# Patient Record
Sex: MALE | Race: WHITE | URBAN - METROPOLITAN AREA
[De-identification: names, ages, dates, MRNs, and addresses within clinical notes are randomized per-mention and may not be internally consistent; named-entity substitution may affect disease eponyms.]

---

## 2017-03-20 ENCOUNTER — OFFICE VISIT (OUTPATIENT)
Dept: SURGERY | Facility: CLINIC | Age: 37
End: 2017-03-20
Payer: COMMERCIAL

## 2017-03-20 VITALS
SYSTOLIC BLOOD PRESSURE: 143 MMHG | HEIGHT: 60 IN | BODY MASS INDEX: 49.87 KG/M2 | WEIGHT: 254 LBS | HEART RATE: 84 BPM | DIASTOLIC BLOOD PRESSURE: 95 MMHG

## 2017-03-20 DIAGNOSIS — R10.31 RIGHT GROIN PAIN: Primary | ICD-10-CM

## 2017-03-20 PROCEDURE — 99203 OFFICE O/P NEW LOW 30 MIN: CPT | Performed by: SURGERY

## 2017-03-20 NOTE — PROGRESS NOTES
[facility] Consultation    CHIEF COMPLAINT:  Chief Complaint   Patient presents with     Consult     inguinal hernia        HISTORY OF PRESENT ILLNESS:  Feng Lea is a 36 year old male who is seen in consultation at the request of Dr. Mcclellan for evaluation of right groin pain, possible recurrent hernia.  He has a history of open right inguinal hernia repair, first in 1992 (no mesh) then recurrent repair with mesh in 2009.  About a week ago he developed some discomfort in his right groin while bending forward to tie his shoes but ever seen has felt well.    REVIEW OF SYSTEMS:  Constitutional:  Negative for chills, fatigue, fever and increase weight since last groin operation.  Eyes:  Negative for blurred vision  ENT:  Negative for ENT pain.  Cardiovascular:  Negative for chest pain, palpitations.  Respiratory:  Negative for cough.  Gastrointestinal:  Negative for abdominal pain, heartburn, constipation, diarrhea and stool changes.  Musculoskeletal:  Negative for arthralgias, back pain and myalgias. See HPI.      Past Medical History   Diagnosis Date     Pulmonary embolism (H) 01/2015       Past Surgical History   Procedure Laterality Date     Surgical history of -   age 11     right inguinal hernia repair     Hernia repair       2009     Back surgery         Family History   Problem Relation Age of Onset     Breast Cancer Paternal Grandmother      alzheimers     CANCER Maternal Grandmother      skin cancer     Depression Father      Depression Brother      Allergies Son      C.A.D. Paternal Uncle      mi,maybe htn     C.A.D. Paternal Uncle      mi,maybe htn,stroke     CANCER Maternal Aunt      not sure what kind     DIABETES No family hx of      Cancer - colorectal No family hx of      Prostate Cancer No family hx of        Social History   Substance Use Topics     Smoking status: Never Smoker     Smokeless tobacco: Not on file     Alcohol use No       There is no problem list on file for  this patient.      Allergies   Allergen Reactions     No Known Allergies        Current Outpatient Prescriptions   Medication Sig Dispense Refill     Vilazodone HCl (VIIBRYD PO) Take 40 mg by mouth       ALPRAZolam (XANAX PO)        Rivaroxaban (XARELTO PO) Take 20 mg by mouth       Zolpidem Tartrate (AMBIEN PO)        METHOCARBAMOL PO        GABAPENTIN PO          Vitals: BP (!) 143/95  Pulse 84  Ht 5' (1.524 m)  Wt 254 lb (115.2 kg)  BMI 49.61 kg/m2  BMI= Body mass index is 49.61 kg/(m^2).    EXAM:  GENERAL: obese, alert and no distress   HEENT: moist mucus membranes, no scleral icterus  CARDIOVASCULAR:  No JVD  RESPIRATORY: non labored breathing  NECK: Neck supple. No noticeable masses.  ABDOMEN: soft, nontender, nondistended, no hernia palpated.  Extremities: warm and well perfused, no edema  SKIN: No suspicious lesions or rashes  LYMPH: Normal inguinal lymph nodes    LABS/Imaging: none    ASSESSMENT:  Feng Lea suffers from right groin pain.    PLAN:  I discussed with Mr. Lea and his wife that given the scar tissue in this area, hernia could be difficult to palpate, he has not noticed a bulge neither I do on physical examination, all I can appreciate is scar tissue.   We talked about resting and icing his right groin and treating it as a groin strain for the next couple weeks.  Once he returns to full activities if he is to develop pain once again or notices a bulge then we can proceed with surgical treatment or obtain radiographic evaluation (MRI).  I stressed the importance of weight loss for the overall health but also for the health of his right groin.  He will touch base with us based on his symptoms, we will update you his progress.    It is my pleasure to participate in the care of Feng Lea. Thank you for this consultation.     If you have any questions please give me a call.    Best regards,  Darrell Arteaga MD    Please route or send letter to:  Primary Care Provider (PCP), Referring  Provider and Include Progress Note    Total time with patient visit: 30 minutes more than half spent in counseling, explanation of procedures and coordination of care.

## 2017-03-20 NOTE — LETTER
2017    RE:  Feng Lea-:  80    HISTORY OF PRESENT ILLNESS: Feng Lea is a 36 year old male who is seen in consultation at the request of Dr. Mcclellan for evaluation of right groin pain, possible recurrent hernia. He has a history of open right inguinal hernia repair, first in  (no mesh) then recurrent repair with mesh in . About a week ago he developed some discomfort in his right groin while bending forward to tie his shoes but ever seen has felt well.     REVIEW OF SYSTEMS:  Constitutional: Negative for chills, fatigue, fever and increase weight since last groin operation.  Eyes: Negative for blurred vision  ENT: Negative for ENT pain.  Cardiovascular: Negative for chest pain, palpitations.  Respiratory: Negative for cough.  Gastrointestinal: Negative for abdominal pain, heartburn, constipation, diarrhea and stool changes.  Musculoskeletal: Negative for arthralgias, back pain and myalgias. See HPI.      Vitals: BP (!) 143/95 Pulse 84 Ht 5' (1.524 m) Wt 254 lb (115.2 kg) BMI 49.61 kg/m2  BMI= Body mass index is 49.61 kg/(m^2).     EXAM:  GENERAL: obese, alert and no distress   HEENT: moist mucus membranes, no scleral icterus  CARDIOVASCULAR: No JVD  RESPIRATORY: non labored breathing  NECK: Neck supple. No noticeable masses.  ABDOMEN: soft, nontender, nondistended, no hernia palpated.  Extremities: warm and well perfused, no edema  SKIN: No suspicious lesions or rashes  LYMPH: Normal inguinal lymph nodes     LABS/Imaging: none     ASSESSMENT:  Feng Lea suffers from right groin pain.     PLAN:  I discussed with Mr. Lea and his wife that given the scar tissue in this area, hernia could be difficult to palpate, he has not noticed a bulge neither I do on physical examination, all I can appreciate is scar tissue.   We talked about resting and icing his right groin and treating it as a groin strain for the next couple weeks. Once he returns to full activities if he is to  develop pain once again or notices a bulge then we can proceed with surgical treatment or obtain radiographic evaluation (MRI). I stressed the importance of weight loss for the overall health but also for the health of his right groin.  He will touch base with us based on his symptoms, we will update you his progress.     It is my pleasure to participate in the care of Feng Lea. Thank you for this consultation.      If you have any questions please give me a call.     Best regards,      Darrell Arteaga MD

## 2017-03-20 NOTE — MR AVS SNAPSHOT
"              After Visit Summary   3/20/2017    Feng Lea    MRN: [id number]           Patient Information     Date Of Birth          1980        Visit Information        Provider Department      3/20/2017 11:00 AM Darrell Arteaga MD [facility]       Follow-ups after your visit        Who to contact     If you have questions or need follow up information about today's clinic visit or your schedule please contact [facility] directly at [phone].  Normal or non-critical lab and imaging results will be communicated to you by [employer], letter or phone within 4 business days after the clinic has received the results. If you do not hear from us within 7 days, please contact the clinic through [employer] or phone. If you have a critical or abnormal lab result, we will notify you by phone as soon as possible.  Submit refill requests through [employer] or call your pharmacy and they will forward the refill request to us. Please allow 3 business days for your refill to be completed.          Additional Information About Your Visit        MyChart Information     [employer] lets you send messages to your doctor, view your test results, renew your prescriptions, schedule appointments and more. To sign up, go to [email or web address] . Click on \"Log in\" on the left side of the screen, which will take you to the Welcome page. Then click on \"Sign up Now\" on the right side of the page.     You will be asked to enter the access code listed below, as well as some personal information. Please follow the directions to create your username and password.     Your access code is: [email or web address]  Expires: 2017 11:48 AM     Your access code will  in 90 days. If you need help or a new code, please call your [place] clinic or [phone].        Care EveryWhere ID     This is your Care EveryWhere ID. This could be used by other organizations to " access your [place] medical records  [id number]        Your Vitals Were     Pulse Height BMI (Body Mass Index)             84 5' (1.524 m) 49.61 kg/m2          Blood Pressure from Last 3 Encounters:   03/20/17 (!) 143/95   11/19/04 124/84    Weight from Last 3 Encounters:   03/20/17 254 lb (115.2 kg)   11/19/04 216 lb (98 kg)              Today, you had the following     No orders found for display       Primary Care Provider Office Phone # Fax #    Vanesa Valente [phone]       [facility] [address] [place] [address]        Thank you!     Thank you for choosing [facility]  for your care. Our goal is always to provide you with excellent care. Hearing back from our patients is one way we can continue to improve our services. Please take a few minutes to complete the written survey that you may receive in the mail after your visit with us. Thank you!             Your Updated Medication List - Protect others around you: Learn how to safely use, store and throw away your medicines at www.disposemymeds.org.          This list is accurate as of: 3/20/17 11:48 AM.  Always use your most recent med list.                   Brand Name Dispense Instructions for use    AMBIEN PO          GABAPENTIN PO          METHOCARBAMOL PO          VIIBRYD PO      Take 40 mg by mouth       XANAX PO          XARELTO PO      Take 20 mg by mouth

## 2020-12-12 PROBLEM — Z13.1 SCREENING FOR DIABETES MELLITUS: Status: ACTIVE | Noted: 2020-12-12

## 2020-12-12 PROBLEM — Z13.220 SCREENING FOR CHOLESTEROL LEVEL: Status: ACTIVE | Noted: 2020-12-12

## 2020-12-12 PROBLEM — Z12.5 SCREENING FOR PROSTATE CANCER: Status: ACTIVE | Noted: 2020-12-12

## 2020-12-15 ENCOUNTER — OFFICE VISIT (OUTPATIENT)
Dept: FAMILY MEDICINE | Age: 40
End: 2020-12-15

## 2020-12-15 ENCOUNTER — LAB SERVICES (OUTPATIENT)
Dept: LAB | Age: 40
End: 2020-12-15

## 2020-12-15 VITALS
DIASTOLIC BLOOD PRESSURE: 88 MMHG | HEIGHT: 72 IN | HEART RATE: 72 BPM | BODY MASS INDEX: 34.39 KG/M2 | SYSTOLIC BLOOD PRESSURE: 138 MMHG | WEIGHT: 253.9 LBS | TEMPERATURE: 96.4 F

## 2020-12-15 DIAGNOSIS — M54.42 CHRONIC MIDLINE LOW BACK PAIN WITH BILATERAL SCIATICA: ICD-10-CM

## 2020-12-15 DIAGNOSIS — Z79.899 HIGH RISK MEDICATION USE: ICD-10-CM

## 2020-12-15 DIAGNOSIS — Z12.5 SCREENING FOR PROSTATE CANCER: ICD-10-CM

## 2020-12-15 DIAGNOSIS — M54.41 CHRONIC MIDLINE LOW BACK PAIN WITH BILATERAL SCIATICA: ICD-10-CM

## 2020-12-15 DIAGNOSIS — G89.4 CHRONIC PAIN SYNDROME: ICD-10-CM

## 2020-12-15 DIAGNOSIS — F41.9 ANXIETY: ICD-10-CM

## 2020-12-15 DIAGNOSIS — M79.7 FIBROMYALGIA: ICD-10-CM

## 2020-12-15 DIAGNOSIS — Z79.01 CURRENT USE OF LONG TERM ANTICOAGULATION: ICD-10-CM

## 2020-12-15 DIAGNOSIS — J30.89 NON-SEASONAL ALLERGIC RHINITIS DUE TO OTHER ALLERGIC TRIGGER: ICD-10-CM

## 2020-12-15 DIAGNOSIS — Z13.220 SCREENING FOR CHOLESTEROL LEVEL: ICD-10-CM

## 2020-12-15 DIAGNOSIS — G89.29 CHRONIC MIDLINE LOW BACK PAIN WITH BILATERAL SCIATICA: ICD-10-CM

## 2020-12-15 DIAGNOSIS — Z13.1 SCREENING FOR DIABETES MELLITUS: ICD-10-CM

## 2020-12-15 DIAGNOSIS — Z86.711 HISTORY OF PULMONARY EMBOLISM: ICD-10-CM

## 2020-12-15 DIAGNOSIS — F31.76 BIPOLAR DISORDER, IN FULL REMISSION, MOST RECENT EPISODE DEPRESSED (CMD): Primary | ICD-10-CM

## 2020-12-15 DIAGNOSIS — K21.9 GASTROESOPHAGEAL REFLUX DISEASE, UNSPECIFIED WHETHER ESOPHAGITIS PRESENT: ICD-10-CM

## 2020-12-15 DIAGNOSIS — K57.30 DIVERTICULOSIS OF COLON: ICD-10-CM

## 2020-12-15 DIAGNOSIS — K52.9 CHRONIC DIARRHEA: ICD-10-CM

## 2020-12-15 PROBLEM — F41.0 PANIC ATTACK: Status: ACTIVE | Noted: 2020-12-15

## 2020-12-15 PROBLEM — E55.9 VITAMIN D DEFICIENCY: Status: ACTIVE | Noted: 2020-12-15

## 2020-12-15 PROBLEM — G47.09 OTHER INSOMNIA: Status: ACTIVE | Noted: 2020-12-15

## 2020-12-15 LAB
AMPHETAMINES UR QL SCN>500 NG/ML: NEGATIVE
BARBITURATES UR QL SCN>200 NG/ML: NEGATIVE
BENZODIAZ UR QL SCN>200 NG/ML: POSITIVE
BZE UR QL SCN>150 NG/ML: NEGATIVE
CANNABINOIDS UR QL SCN>50 NG/ML: NEGATIVE
OPIATES UR QL SCN>300 NG/ML: NEGATIVE
PCP UR QL SCN>25 NG/ML: NEGATIVE

## 2020-12-15 PROCEDURE — 80307 DRUG TEST PRSMV CHEM ANLYZR: CPT | Performed by: CLINICAL MEDICAL LABORATORY

## 2020-12-15 PROCEDURE — 99204 OFFICE O/P NEW MOD 45 MIN: CPT | Performed by: FAMILY MEDICINE

## 2020-12-15 RX ORDER — FLUTICASONE PROPIONATE 50 MCG
2 SPRAY, SUSPENSION (ML) NASAL DAILY
COMMUNITY
End: 2022-02-17 | Stop reason: CLARIF

## 2020-12-15 RX ORDER — MULTIVIT-MIN/IRON/FOLIC ACID/K 18-600-40
50 CAPSULE ORAL DAILY
Status: ON HOLD | COMMUNITY
End: 2021-04-01 | Stop reason: ALTCHOICE

## 2020-12-15 RX ORDER — PREGABALIN 150 MG/1
150 CAPSULE ORAL 2 TIMES DAILY
COMMUNITY
End: 2020-12-15 | Stop reason: SDUPTHER

## 2020-12-15 RX ORDER — VILAZODONE HYDROCHLORIDE 40 MG/1
40 TABLET ORAL DAILY
COMMUNITY
End: 2021-01-06 | Stop reason: ALTCHOICE

## 2020-12-15 RX ORDER — PANTOPRAZOLE SODIUM 40 MG/1
40 TABLET, DELAYED RELEASE ORAL 2 TIMES DAILY
COMMUNITY
End: 2021-11-29 | Stop reason: SDUPTHER

## 2020-12-15 RX ORDER — HYDROXYZINE HYDROCHLORIDE 25 MG/1
25 TABLET, FILM COATED ORAL 3 TIMES DAILY PRN
COMMUNITY
End: 2021-01-13 | Stop reason: SDUPTHER

## 2020-12-15 RX ORDER — VALACYCLOVIR HYDROCHLORIDE 1 G/1
1000 TABLET, FILM COATED ORAL DAILY
COMMUNITY
End: 2021-01-13 | Stop reason: SDUPTHER

## 2020-12-15 RX ORDER — METHOCARBAMOL 750 MG/1
750 TABLET, FILM COATED ORAL 3 TIMES DAILY
COMMUNITY
End: 2021-08-09

## 2020-12-15 RX ORDER — DIVALPROEX SODIUM 500 MG/1
500 TABLET, EXTENDED RELEASE ORAL AT BEDTIME
COMMUNITY
End: 2021-01-06 | Stop reason: SDUPTHER

## 2020-12-15 RX ORDER — RISPERIDONE 1 MG/1
1 TABLET ORAL AT BEDTIME
COMMUNITY
End: 2021-01-06 | Stop reason: SDUPTHER

## 2020-12-15 RX ORDER — MAGNESIUM HYDROXIDE/ALUMINUM HYDROXICE/SIMETHICONE 120; 1200; 1200 MG/30ML; MG/30ML; MG/30ML
10 SUSPENSION ORAL
COMMUNITY

## 2020-12-15 RX ORDER — ALPRAZOLAM 1 MG/1
1 TABLET ORAL 2 TIMES DAILY PRN
COMMUNITY
End: 2021-01-06 | Stop reason: SDUPTHER

## 2020-12-15 RX ORDER — LOPERAMIDE HYDROCHLORIDE 2 MG/1
2 CAPSULE ORAL
COMMUNITY

## 2020-12-15 RX ORDER — ZOLPIDEM TARTRATE 10 MG/1
10 TABLET ORAL NIGHTLY PRN
COMMUNITY
End: 2021-01-06 | Stop reason: ALTCHOICE

## 2020-12-15 RX ORDER — PREGABALIN 150 MG/1
150 CAPSULE ORAL 2 TIMES DAILY
Qty: 60 CAPSULE | Refills: 1 | Status: SHIPPED | OUTPATIENT
Start: 2020-12-15 | End: 2021-01-13 | Stop reason: SDUPTHER

## 2020-12-15 SDOH — HEALTH STABILITY: MENTAL HEALTH: HOW OFTEN DO YOU HAVE A DRINK CONTAINING ALCOHOL?: NEVER

## 2020-12-15 ASSESSMENT — PATIENT HEALTH QUESTIONNAIRE - PHQ9
CLINICAL INTERPRETATION OF PHQ9 SCORE: NO FURTHER SCREENING NEEDED
SUM OF ALL RESPONSES TO PHQ9 QUESTIONS 1 AND 2: 0
SUM OF ALL RESPONSES TO PHQ9 QUESTIONS 1 AND 2: 0
1. LITTLE INTEREST OR PLEASURE IN DOING THINGS: NOT AT ALL
CLINICAL INTERPRETATION OF PHQ2 SCORE: NO FURTHER SCREENING NEEDED
2. FEELING DOWN, DEPRESSED OR HOPELESS: NOT AT ALL

## 2020-12-31 ENCOUNTER — TELEPHONE (OUTPATIENT)
Dept: PSYCHIATRY | Age: 40
End: 2020-12-31

## 2021-01-01 ENCOUNTER — EXTERNAL RECORD (OUTPATIENT)
Dept: OTHER | Age: 41
End: 2021-01-01

## 2021-01-06 ENCOUNTER — V-VISIT (OUTPATIENT)
Dept: PSYCHIATRY | Age: 41
End: 2021-01-06

## 2021-01-06 ENCOUNTER — TELEPHONE (OUTPATIENT)
Dept: PSYCHIATRY | Age: 41
End: 2021-01-06

## 2021-01-06 DIAGNOSIS — F31.0 BIPOLAR AFFECTIVE DISORDER, CURRENT EPISODE HYPOMANIC (CMD): Primary | ICD-10-CM

## 2021-01-06 PROBLEM — F41.9 ANXIETY: Status: RESOLVED | Noted: 2020-12-15 | Resolved: 2021-01-06

## 2021-01-06 PROBLEM — F41.0 PANIC ATTACK: Status: RESOLVED | Noted: 2020-12-15 | Resolved: 2021-01-06

## 2021-01-06 PROBLEM — G47.09 OTHER INSOMNIA: Status: RESOLVED | Noted: 2020-12-15 | Resolved: 2021-01-06

## 2021-01-06 PROBLEM — F31.10 BIPOLAR AFFECTIVE DISORDER, CURRENT EPISODE MANIC (CMD): Status: ACTIVE | Noted: 2020-12-15

## 2021-01-06 PROCEDURE — 90792 PSYCH DIAG EVAL W/MED SRVCS: CPT | Performed by: NURSE PRACTITIONER

## 2021-01-06 RX ORDER — RISPERIDONE 1 MG/1
1 TABLET ORAL AT BEDTIME
Qty: 30 TABLET | Refills: 3 | Status: SHIPPED | OUTPATIENT
Start: 2021-01-06 | End: 2021-01-08

## 2021-01-06 RX ORDER — DIVALPROEX SODIUM 250 MG/1
250 TABLET, EXTENDED RELEASE ORAL NIGHTLY
Qty: 30 TABLET | Refills: 3 | Status: SHIPPED | OUTPATIENT
Start: 2021-01-06 | End: 2021-01-27 | Stop reason: DRUGHIGH

## 2021-01-06 RX ORDER — ALPRAZOLAM 1 MG/1
1 TABLET ORAL 2 TIMES DAILY PRN
Qty: 60 TABLET | Refills: 3 | Status: SHIPPED | OUTPATIENT
Start: 2021-01-06 | End: 2021-10-27

## 2021-01-06 RX ORDER — DIVALPROEX SODIUM 500 MG/1
500 TABLET, EXTENDED RELEASE ORAL AT BEDTIME
Qty: 30 TABLET | Refills: 3 | Status: SHIPPED | OUTPATIENT
Start: 2021-01-06 | End: 2021-01-27 | Stop reason: SDUPTHER

## 2021-01-06 RX ORDER — VILAZODONE HYDROCHLORIDE 40 MG/1
40 TABLET ORAL DAILY
Qty: 30 TABLET | Refills: 3 | Status: SHIPPED | OUTPATIENT
Start: 2021-01-06 | End: 2021-01-27 | Stop reason: SDUPTHER

## 2021-01-06 RX ORDER — QUETIAPINE FUMARATE 50 MG/1
TABLET, FILM COATED ORAL
Qty: 60 TABLET | Refills: 3 | Status: SHIPPED | OUTPATIENT
Start: 2021-01-06 | End: 2021-01-08 | Stop reason: SDUPTHER

## 2021-01-08 ENCOUNTER — TELEPHONE (OUTPATIENT)
Dept: PSYCHIATRY | Age: 41
End: 2021-01-08

## 2021-01-08 ENCOUNTER — LAB SERVICES (OUTPATIENT)
Dept: LAB | Age: 41
End: 2021-01-08

## 2021-01-08 DIAGNOSIS — F31.0 BIPOLAR AFFECTIVE DISORDER, CURRENT EPISODE HYPOMANIC (CMD): ICD-10-CM

## 2021-01-08 LAB — VALPROATE SERPL-MCNC: 33 MCG/ML (ref 50–125)

## 2021-01-08 PROCEDURE — 36415 COLL VENOUS BLD VENIPUNCTURE: CPT | Performed by: INTERNAL MEDICINE

## 2021-01-08 PROCEDURE — 80164 ASSAY DIPROPYLACETIC ACD TOT: CPT | Performed by: CLINICAL MEDICAL LABORATORY

## 2021-01-08 RX ORDER — QUETIAPINE FUMARATE 50 MG/1
TABLET, FILM COATED ORAL
Qty: 60 TABLET | Refills: 3 | Status: SHIPPED | COMMUNITY
Start: 2021-01-08 | End: 2021-01-27 | Stop reason: SDUPTHER

## 2021-01-13 ENCOUNTER — OFFICE VISIT (OUTPATIENT)
Dept: FAMILY MEDICINE | Age: 41
End: 2021-01-13

## 2021-01-13 VITALS
WEIGHT: 255.4 LBS | HEIGHT: 72 IN | TEMPERATURE: 97.4 F | DIASTOLIC BLOOD PRESSURE: 90 MMHG | HEART RATE: 74 BPM | SYSTOLIC BLOOD PRESSURE: 124 MMHG | BODY MASS INDEX: 34.59 KG/M2

## 2021-01-13 DIAGNOSIS — M79.7 FIBROMYALGIA: ICD-10-CM

## 2021-01-13 DIAGNOSIS — E66.9 OBESITY (BMI 30-39.9): ICD-10-CM

## 2021-01-13 DIAGNOSIS — Z13.220 SCREENING FOR CHOLESTEROL LEVEL: ICD-10-CM

## 2021-01-13 DIAGNOSIS — G89.29 CHRONIC MIDLINE LOW BACK PAIN WITH BILATERAL SCIATICA: ICD-10-CM

## 2021-01-13 DIAGNOSIS — E55.9 VITAMIN D DEFICIENCY: ICD-10-CM

## 2021-01-13 DIAGNOSIS — Z13.1 SCREENING FOR DIABETES MELLITUS: ICD-10-CM

## 2021-01-13 DIAGNOSIS — E01.0 THYROMEGALY: ICD-10-CM

## 2021-01-13 DIAGNOSIS — G89.4 CHRONIC PAIN SYNDROME: ICD-10-CM

## 2021-01-13 DIAGNOSIS — A60.01 HERPES SIMPLEX INFECTION OF PENIS: Primary | ICD-10-CM

## 2021-01-13 DIAGNOSIS — M54.42 CHRONIC MIDLINE LOW BACK PAIN WITH BILATERAL SCIATICA: ICD-10-CM

## 2021-01-13 DIAGNOSIS — K52.9 CHRONIC DIARRHEA: ICD-10-CM

## 2021-01-13 DIAGNOSIS — M54.41 CHRONIC MIDLINE LOW BACK PAIN WITH BILATERAL SCIATICA: ICD-10-CM

## 2021-01-13 DIAGNOSIS — J30.89 NON-SEASONAL ALLERGIC RHINITIS DUE TO OTHER ALLERGIC TRIGGER: ICD-10-CM

## 2021-01-13 PROCEDURE — 99214 OFFICE O/P EST MOD 30 MIN: CPT | Performed by: FAMILY MEDICINE

## 2021-01-13 RX ORDER — VALACYCLOVIR HYDROCHLORIDE 500 MG/1
1000 TABLET, FILM COATED ORAL DAILY
Qty: 90 TABLET | Refills: 1 | Status: SHIPPED | OUTPATIENT
Start: 2021-01-13 | End: 2021-01-29

## 2021-01-13 RX ORDER — HYDROXYZINE 50 MG/1
50 TABLET, FILM COATED ORAL NIGHTLY PRN
Qty: 90 TABLET | Refills: 1 | Status: SHIPPED | OUTPATIENT
Start: 2021-01-13 | End: 2021-04-28

## 2021-01-13 RX ORDER — PREGABALIN 150 MG/1
150 CAPSULE ORAL 2 TIMES DAILY
Qty: 60 CAPSULE | Refills: 5 | Status: SHIPPED | OUTPATIENT
Start: 2021-01-13 | End: 2021-07-29 | Stop reason: SDUPTHER

## 2021-01-13 SDOH — HEALTH STABILITY: MENTAL HEALTH: HOW OFTEN DO YOU HAVE A DRINK CONTAINING ALCOHOL?: NEVER

## 2021-01-14 ENCOUNTER — LAB SERVICES (OUTPATIENT)
Dept: LAB | Age: 41
End: 2021-01-14

## 2021-01-14 DIAGNOSIS — Z13.220 SCREENING FOR CHOLESTEROL LEVEL: ICD-10-CM

## 2021-01-14 DIAGNOSIS — E66.9 OBESITY (BMI 30-39.9): ICD-10-CM

## 2021-01-14 DIAGNOSIS — K52.9 CHRONIC DIARRHEA: ICD-10-CM

## 2021-01-14 DIAGNOSIS — Z13.1 SCREENING FOR DIABETES MELLITUS: ICD-10-CM

## 2021-01-14 DIAGNOSIS — E01.0 THYROMEGALY: ICD-10-CM

## 2021-01-14 LAB
ALBUMIN SERPL-MCNC: 3.5 G/DL (ref 3.6–5.1)
ALBUMIN/GLOB SERPL: 1 {RATIO} (ref 1–2.4)
ALP SERPL-CCNC: 63 UNITS/L (ref 45–117)
ALT SERPL-CCNC: 37 UNITS/L
ANION GAP SERPL CALC-SCNC: 14 MMOL/L (ref 10–20)
AST SERPL-CCNC: 19 UNITS/L
BASOPHILS # BLD: 0 K/MCL (ref 0–0.3)
BASOPHILS NFR BLD: 1 %
BILIRUB SERPL-MCNC: 0.3 MG/DL (ref 0.2–1)
BUN SERPL-MCNC: 16 MG/DL (ref 6–20)
BUN/CREAT SERPL: 16 (ref 7–25)
CALCIUM SERPL-MCNC: 9.3 MG/DL (ref 8.4–10.2)
CHLORIDE SERPL-SCNC: 108 MMOL/L (ref 98–107)
CHOLEST SERPL-MCNC: 196 MG/DL
CHOLEST/HDLC SERPL: 5.4 {RATIO}
CO2 SERPL-SCNC: 29 MMOL/L (ref 21–32)
CREAT SERPL-MCNC: 1 MG/DL (ref 0.67–1.17)
CRP SERPL-MCNC: <0.3 MG/DL
DEPRECATED RDW RBC: 47.4 FL (ref 39–50)
EOSINOPHIL # BLD: 0.2 K/MCL (ref 0–0.5)
EOSINOPHIL NFR BLD: 3 %
ERYTHROCYTE [DISTWIDTH] IN BLOOD: 14.9 % (ref 11–15)
ERYTHROCYTE [SEDIMENTATION RATE] IN BLOOD BY WESTERGREN METHOD: 7 MM/HR (ref 0–20)
FASTING DURATION TIME PATIENT: 12 HOURS
FASTING DURATION TIME PATIENT: 12 HOURS
GFR SERPLBLD BASED ON 1.73 SQ M-ARVRAT: >90 ML/MIN/1.73M2
GLOBULIN SER-MCNC: 3.6 G/DL (ref 2–4)
GLUCOSE SERPL-MCNC: 98 MG/DL (ref 65–99)
HCT VFR BLD CALC: 45.1 % (ref 39–51)
HDLC SERPL-MCNC: 36 MG/DL
HGB BLD-MCNC: 15 G/DL (ref 13–17)
LDLC SERPL CALC-MCNC: 132 MG/DL
LYMPHOCYTES # BLD: 2.4 K/MCL (ref 1–4.8)
LYMPHOCYTES NFR BLD: 46 %
MCH RBC QN AUTO: 29.8 PG (ref 26–34)
MCHC RBC AUTO-ENTMCNC: 33.3 G/DL (ref 32–36.5)
MCV RBC AUTO: 89.5 FL (ref 78–100)
MONOCYTES # BLD: 0.5 K/MCL (ref 0.3–0.9)
MONOCYTES NFR BLD: 9 %
NEUTROPHILS # BLD: 2.2 K/MCL (ref 1.8–7.7)
NEUTROPHILS NFR BLD: 41 %
NONHDLC SERPL-MCNC: 160 MG/DL
PLATELET # BLD AUTO: 237 K/MCL (ref 140–450)
POTASSIUM SERPL-SCNC: 4.5 MMOL/L (ref 3.4–5.1)
PROT SERPL-MCNC: 7.1 G/DL (ref 6.4–8.2)
RBC # BLD: 5.04 MIL/MCL (ref 4.5–5.9)
SODIUM SERPL-SCNC: 146 MMOL/L (ref 135–145)
T3FREE SERPL-MCNC: 3 PG/ML (ref 2.2–4)
T4 FREE SERPL-MCNC: 0.9 NG/DL (ref 0.8–1.5)
TRIGL SERPL-MCNC: 141 MG/DL
TSH SERPL-ACNC: 0.87 MCUNITS/ML (ref 0.35–5)
WBC # BLD: 5.3 K/MCL (ref 4.2–11)

## 2021-01-14 PROCEDURE — 85025 COMPLETE CBC W/AUTO DIFF WBC: CPT | Performed by: INTERNAL MEDICINE

## 2021-01-14 PROCEDURE — 84481 FREE ASSAY (FT-3): CPT | Performed by: CLINICAL MEDICAL LABORATORY

## 2021-01-14 PROCEDURE — 84439 ASSAY OF FREE THYROXINE: CPT | Performed by: CLINICAL MEDICAL LABORATORY

## 2021-01-14 PROCEDURE — 82784 ASSAY IGA/IGD/IGG/IGM EACH: CPT | Performed by: CLINICAL MEDICAL LABORATORY

## 2021-01-14 PROCEDURE — 80061 LIPID PANEL: CPT | Performed by: CLINICAL MEDICAL LABORATORY

## 2021-01-14 PROCEDURE — 80053 COMPREHEN METABOLIC PANEL: CPT | Performed by: INTERNAL MEDICINE

## 2021-01-14 PROCEDURE — 85652 RBC SED RATE AUTOMATED: CPT | Performed by: INTERNAL MEDICINE

## 2021-01-14 PROCEDURE — 86140 C-REACTIVE PROTEIN: CPT | Performed by: CLINICAL MEDICAL LABORATORY

## 2021-01-14 PROCEDURE — 36415 COLL VENOUS BLD VENIPUNCTURE: CPT | Performed by: INTERNAL MEDICINE

## 2021-01-14 PROCEDURE — 83516 IMMUNOASSAY NONANTIBODY: CPT | Performed by: CLINICAL MEDICAL LABORATORY

## 2021-01-14 PROCEDURE — 84443 ASSAY THYROID STIM HORMONE: CPT | Performed by: CLINICAL MEDICAL LABORATORY

## 2021-01-15 LAB
IGA SERPL-MCNC: 299 MG/DL (ref 82–453)
TTG IGA SER IA-ACNC: <20 UNITS

## 2021-01-16 ENCOUNTER — HOSPITAL ENCOUNTER (OUTPATIENT)
Dept: ULTRASOUND IMAGING | Age: 41
Discharge: HOME | End: 2021-01-16
Attending: FAMILY MEDICINE

## 2021-01-16 DIAGNOSIS — E01.0 THYROMEGALY: ICD-10-CM

## 2021-01-16 PROCEDURE — 76536 US EXAM OF HEAD AND NECK: CPT | Performed by: RADIOLOGY

## 2021-01-16 PROCEDURE — 76536 US EXAM OF HEAD AND NECK: CPT

## 2021-01-18 ENCOUNTER — TELEPHONE (OUTPATIENT)
Dept: FAMILY MEDICINE | Age: 41
End: 2021-01-18

## 2021-01-18 DIAGNOSIS — E04.2 MULTIPLE THYROID NODULES: Primary | ICD-10-CM

## 2021-01-27 ENCOUNTER — V-VISIT (OUTPATIENT)
Dept: PSYCHIATRY | Age: 41
End: 2021-01-27

## 2021-01-27 DIAGNOSIS — F31.0 BIPOLAR AFFECTIVE DISORDER, CURRENT EPISODE HYPOMANIC (CMD): Primary | ICD-10-CM

## 2021-01-27 PROCEDURE — 99214 OFFICE O/P EST MOD 30 MIN: CPT | Performed by: NURSE PRACTITIONER

## 2021-01-27 RX ORDER — QUETIAPINE FUMARATE 200 MG/1
TABLET, FILM COATED ORAL
Qty: 45 TABLET | Refills: 3 | Status: SHIPPED | OUTPATIENT
Start: 2021-01-27 | End: 2021-01-29

## 2021-01-27 RX ORDER — DIVALPROEX SODIUM 500 MG/1
500 TABLET, EXTENDED RELEASE ORAL AT BEDTIME
Qty: 30 TABLET | Refills: 3 | Status: SHIPPED | OUTPATIENT
Start: 2021-01-27 | End: 2021-09-08

## 2021-01-27 RX ORDER — DIVALPROEX SODIUM 250 MG/1
250 TABLET, EXTENDED RELEASE ORAL 2 TIMES DAILY
Qty: 30 TABLET | Refills: 3 | Status: SHIPPED | OUTPATIENT
Start: 2021-01-27 | End: 2021-03-22

## 2021-01-27 RX ORDER — VILAZODONE HYDROCHLORIDE 40 MG/1
40 TABLET ORAL DAILY
Qty: 30 TABLET | Refills: 3 | Status: SHIPPED | OUTPATIENT
Start: 2021-01-27 | End: 2021-09-21 | Stop reason: SDUPTHER

## 2021-01-28 DIAGNOSIS — A60.01 HERPES SIMPLEX INFECTION OF PENIS: ICD-10-CM

## 2021-01-28 DIAGNOSIS — F31.0 BIPOLAR AFFECTIVE DISORDER, CURRENT EPISODE HYPOMANIC (CMD): ICD-10-CM

## 2021-01-29 ENCOUNTER — E-ADVICE (OUTPATIENT)
Dept: FAMILY MEDICINE | Age: 41
End: 2021-01-29

## 2021-01-29 ENCOUNTER — OFFICE VISIT (OUTPATIENT)
Dept: OTOLARYNGOLOGY | Age: 41
End: 2021-01-29
Attending: FAMILY MEDICINE

## 2021-01-29 DIAGNOSIS — E04.1 THYROID NODULE: ICD-10-CM

## 2021-01-29 DIAGNOSIS — Z01.812 PRE-PROCEDURAL LABORATORY EXAMINATION: Primary | ICD-10-CM

## 2021-01-29 PROCEDURE — 99203 OFFICE O/P NEW LOW 30 MIN: CPT | Performed by: OTOLARYNGOLOGY

## 2021-01-29 RX ORDER — VALACYCLOVIR HYDROCHLORIDE 500 MG/1
TABLET, FILM COATED ORAL
Qty: 180 TABLET | Refills: 1 | Status: SHIPPED | OUTPATIENT
Start: 2021-01-29 | End: 2021-05-13 | Stop reason: SDUPTHER

## 2021-01-29 RX ORDER — QUETIAPINE FUMARATE 200 MG/1
TABLET, FILM COATED ORAL
Qty: 135 TABLET | Refills: 1 | Status: SHIPPED | OUTPATIENT
Start: 2021-01-29 | End: 2021-09-21 | Stop reason: SDUPTHER

## 2021-02-02 ENCOUNTER — OFFICE VISIT (OUTPATIENT)
Dept: FAMILY MEDICINE | Age: 41
End: 2021-02-02

## 2021-02-02 VITALS
WEIGHT: 257.4 LBS | DIASTOLIC BLOOD PRESSURE: 74 MMHG | HEIGHT: 72 IN | HEART RATE: 80 BPM | TEMPERATURE: 97.3 F | BODY MASS INDEX: 34.86 KG/M2 | SYSTOLIC BLOOD PRESSURE: 128 MMHG

## 2021-02-02 DIAGNOSIS — R10.32 LEFT GROIN PAIN: Primary | ICD-10-CM

## 2021-02-02 PROCEDURE — 99213 OFFICE O/P EST LOW 20 MIN: CPT | Performed by: FAMILY MEDICINE

## 2021-02-02 SDOH — HEALTH STABILITY: MENTAL HEALTH: HOW OFTEN DO YOU HAVE A DRINK CONTAINING ALCOHOL?: NEVER

## 2021-02-04 ENCOUNTER — TELEPHONE (OUTPATIENT)
Dept: INTERVENTIONAL RADIOLOGY/VASCULAR | Age: 41
End: 2021-02-04

## 2021-02-04 DIAGNOSIS — Z01.812 PRE-PROCEDURE LAB EXAM: Primary | ICD-10-CM

## 2021-02-04 DIAGNOSIS — F31.0 BIPOLAR AFFECTIVE DISORDER, CURRENT EPISODE HYPOMANIC (CMD): ICD-10-CM

## 2021-02-04 RX ORDER — DIVALPROEX SODIUM 250 MG/1
TABLET, EXTENDED RELEASE ORAL
Qty: 30 TABLET | Refills: 3 | OUTPATIENT
Start: 2021-02-04

## 2021-02-08 ENCOUNTER — TELEPHONE (OUTPATIENT)
Dept: INTERVENTIONAL RADIOLOGY/VASCULAR | Age: 41
End: 2021-02-08

## 2021-02-09 ENCOUNTER — APPOINTMENT (OUTPATIENT)
Dept: OTOLARYNGOLOGY | Age: 41
End: 2021-02-09

## 2021-02-10 ENCOUNTER — APPOINTMENT (OUTPATIENT)
Dept: LAB | Age: 41
End: 2021-02-10
Attending: OTOLARYNGOLOGY

## 2021-02-10 PROCEDURE — U0003 INFECTIOUS AGENT DETECTION BY NUCLEIC ACID (DNA OR RNA); SEVERE ACUTE RESPIRATORY SYNDROME CORONAVIRUS 2 (SARS-COV-2) (CORONAVIRUS DISEASE [COVID-19]), AMPLIFIED PROBE TECHNIQUE, MAKING USE OF HIGH THROUGHPUT TECHNOLOGIES AS DESCRIBED BY CMS-2020-01-R: HCPCS | Performed by: CLINICAL MEDICAL LABORATORY

## 2021-02-10 PROCEDURE — U0005 INFEC AGEN DETEC AMPLI PROBE: HCPCS | Performed by: CLINICAL MEDICAL LABORATORY

## 2021-02-11 LAB
SARS-COV-2 RNA RESP QL NAA+PROBE: NOT DETECTED
SERVICE CMNT-IMP: NORMAL
SERVICE CMNT-IMP: NORMAL

## 2021-02-12 ENCOUNTER — HOSPITAL ENCOUNTER (OUTPATIENT)
Dept: ULTRASOUND IMAGING | Age: 41
Discharge: HOME | End: 2021-02-12
Attending: OTOLARYNGOLOGY

## 2021-02-12 ENCOUNTER — APPOINTMENT (OUTPATIENT)
Dept: OTOLARYNGOLOGY | Age: 41
End: 2021-02-12

## 2021-02-12 DIAGNOSIS — E04.1 THYROID NODULE: ICD-10-CM

## 2021-02-12 PROCEDURE — 88173 CYTOPATH EVAL FNA REPORT: CPT | Performed by: OTOLARYNGOLOGY

## 2021-02-12 PROCEDURE — 10005 FNA BX W/US GDN 1ST LES: CPT

## 2021-02-12 PROCEDURE — 10002801 HB RX 250 W/O HCPCS: Performed by: RADIOLOGY

## 2021-02-12 PROCEDURE — 10005 FNA BX W/US GDN 1ST LES: CPT | Performed by: RADIOLOGY

## 2021-02-12 RX ORDER — LIDOCAINE HYDROCHLORIDE 10 MG/ML
10 INJECTION, SOLUTION INFILTRATION; PERINEURAL ONCE
Status: COMPLETED | OUTPATIENT
Start: 2021-02-12 | End: 2021-02-12

## 2021-02-12 RX ADMIN — LIDOCAINE HYDROCHLORIDE 40 MG: 10 INJECTION, SOLUTION INFILTRATION; PERINEURAL at 09:30

## 2021-02-15 LAB
ASR DISCLAIMER: NORMAL
CASE RPRT: NORMAL
CLINICAL INFO: NORMAL
PATH REPORT.FINAL DX SPEC: NORMAL
PATH REPORT.FINAL DX SPEC: NORMAL
PATH REPORT.GROSS SPEC: NORMAL
PATH REPORT.MICROSCOPIC SPEC OTHER STN: NORMAL

## 2021-02-16 ENCOUNTER — V-VISIT (OUTPATIENT)
Dept: PSYCHIATRY | Age: 41
End: 2021-02-16

## 2021-02-16 DIAGNOSIS — F31.0 BIPOLAR AFFECTIVE DISORDER, CURRENT EPISODE HYPOMANIC (CMD): Primary | ICD-10-CM

## 2021-02-16 PROCEDURE — 99214 OFFICE O/P EST MOD 30 MIN: CPT | Performed by: NURSE PRACTITIONER

## 2021-02-17 ENCOUNTER — HOSPITAL ENCOUNTER (OUTPATIENT)
Dept: ULTRASOUND IMAGING | Age: 41
Discharge: HOME | End: 2021-02-17
Attending: FAMILY MEDICINE

## 2021-02-17 DIAGNOSIS — R10.32 LEFT GROIN PAIN: ICD-10-CM

## 2021-02-17 PROCEDURE — 76857 US EXAM PELVIC LIMITED: CPT

## 2021-02-17 PROCEDURE — 76857 US EXAM PELVIC LIMITED: CPT | Performed by: RADIOLOGY

## 2021-02-18 ENCOUNTER — TELEPHONE (OUTPATIENT)
Dept: FAMILY MEDICINE | Age: 41
End: 2021-02-18

## 2021-02-18 DIAGNOSIS — K40.90 LEFT INGUINAL HERNIA: Primary | ICD-10-CM

## 2021-02-19 ENCOUNTER — OFFICE VISIT (OUTPATIENT)
Dept: OTOLARYNGOLOGY | Age: 41
End: 2021-02-19

## 2021-02-19 DIAGNOSIS — Z01.812 PRE-PROCEDURAL LABORATORY EXAMINATION: Primary | ICD-10-CM

## 2021-02-19 DIAGNOSIS — E04.1 THYROID NODULE: ICD-10-CM

## 2021-02-19 PROCEDURE — 99214 OFFICE O/P EST MOD 30 MIN: CPT | Performed by: OTOLARYNGOLOGY

## 2021-02-22 DIAGNOSIS — F31.0 BIPOLAR AFFECTIVE DISORDER, CURRENT EPISODE HYPOMANIC (CMD): ICD-10-CM

## 2021-02-22 RX ORDER — DIVALPROEX SODIUM 250 MG/1
TABLET, EXTENDED RELEASE ORAL
Qty: 30 TABLET | Refills: 3 | OUTPATIENT
Start: 2021-02-22

## 2021-02-24 ENCOUNTER — OFFICE VISIT (OUTPATIENT)
Dept: SURGERY | Age: 41
End: 2021-02-24
Attending: FAMILY MEDICINE

## 2021-02-24 VITALS
WEIGHT: 259.8 LBS | HEART RATE: 68 BPM | DIASTOLIC BLOOD PRESSURE: 94 MMHG | BODY MASS INDEX: 35.73 KG/M2 | SYSTOLIC BLOOD PRESSURE: 118 MMHG

## 2021-02-24 DIAGNOSIS — K40.90 LEFT INGUINAL HERNIA: Primary | ICD-10-CM

## 2021-02-24 PROCEDURE — 99203 OFFICE O/P NEW LOW 30 MIN: CPT | Performed by: SURGERY

## 2021-02-28 PROBLEM — E04.1 THYROID NODULE: Status: ACTIVE | Noted: 2021-02-28

## 2021-03-03 ENCOUNTER — OFFICE VISIT (OUTPATIENT)
Dept: FAMILY MEDICINE | Age: 41
End: 2021-03-03
Attending: OTOLARYNGOLOGY

## 2021-03-03 VITALS
HEART RATE: 90 BPM | HEIGHT: 71 IN | DIASTOLIC BLOOD PRESSURE: 72 MMHG | WEIGHT: 256.9 LBS | SYSTOLIC BLOOD PRESSURE: 104 MMHG | TEMPERATURE: 97.5 F | BODY MASS INDEX: 35.97 KG/M2

## 2021-03-03 DIAGNOSIS — M54.42 CHRONIC MIDLINE LOW BACK PAIN WITH BILATERAL SCIATICA: ICD-10-CM

## 2021-03-03 DIAGNOSIS — M79.7 FIBROMYALGIA: ICD-10-CM

## 2021-03-03 DIAGNOSIS — M54.41 CHRONIC MIDLINE LOW BACK PAIN WITH BILATERAL SCIATICA: ICD-10-CM

## 2021-03-03 DIAGNOSIS — K52.9 CHRONIC DIARRHEA: ICD-10-CM

## 2021-03-03 DIAGNOSIS — G89.29 CHRONIC MIDLINE LOW BACK PAIN WITH BILATERAL SCIATICA: ICD-10-CM

## 2021-03-03 DIAGNOSIS — Z79.01 CURRENT USE OF LONG TERM ANTICOAGULATION: ICD-10-CM

## 2021-03-03 DIAGNOSIS — A60.01 HERPES SIMPLEX INFECTION OF PENIS: ICD-10-CM

## 2021-03-03 DIAGNOSIS — E66.9 OBESITY (BMI 30-39.9): ICD-10-CM

## 2021-03-03 DIAGNOSIS — E78.5 HYPERLIPIDEMIA, UNSPECIFIED HYPERLIPIDEMIA TYPE: ICD-10-CM

## 2021-03-03 DIAGNOSIS — Z01.818 PREOPERATIVE CLEARANCE: Primary | ICD-10-CM

## 2021-03-03 DIAGNOSIS — G89.4 CHRONIC PAIN SYNDROME: ICD-10-CM

## 2021-03-03 DIAGNOSIS — K21.9 GASTROESOPHAGEAL REFLUX DISEASE, UNSPECIFIED WHETHER ESOPHAGITIS PRESENT: ICD-10-CM

## 2021-03-03 DIAGNOSIS — E04.1 THYROID NODULE: ICD-10-CM

## 2021-03-03 DIAGNOSIS — Z86.711 HISTORY OF PULMONARY EMBOLISM: ICD-10-CM

## 2021-03-03 DIAGNOSIS — Z23 NEED FOR VACCINATION: ICD-10-CM

## 2021-03-03 DIAGNOSIS — J30.89 NON-SEASONAL ALLERGIC RHINITIS DUE TO OTHER ALLERGIC TRIGGER: ICD-10-CM

## 2021-03-03 DIAGNOSIS — E01.0 THYROMEGALY: ICD-10-CM

## 2021-03-03 DIAGNOSIS — E55.9 VITAMIN D DEFICIENCY: ICD-10-CM

## 2021-03-03 DIAGNOSIS — F31.0 BIPOLAR AFFECTIVE DISORDER, CURRENT EPISODE HYPOMANIC (CMD): ICD-10-CM

## 2021-03-03 PROCEDURE — 99214 OFFICE O/P EST MOD 30 MIN: CPT | Performed by: FAMILY MEDICINE

## 2021-03-03 PROCEDURE — 93000 ELECTROCARDIOGRAM COMPLETE: CPT | Performed by: FAMILY MEDICINE

## 2021-03-03 PROCEDURE — 90715 TDAP VACCINE 7 YRS/> IM: CPT | Performed by: FAMILY MEDICINE

## 2021-03-03 PROCEDURE — 90471 IMMUNIZATION ADMIN: CPT | Performed by: FAMILY MEDICINE

## 2021-03-05 SDOH — HEALTH STABILITY: MENTAL HEALTH: HOW OFTEN DO YOU HAVE A DRINK CONTAINING ALCOHOL?: NEVER

## 2021-03-05 ASSESSMENT — ACTIVITIES OF DAILY LIVING (ADL)
RECENT_DECLINE_ADL: NO
SENSORY_SUPPORT_DEVICES: EYEGLASSES
ADL_SHORT_OF_BREATH: NO
ADL_BEFORE_ADMISSION: INDEPENDENT
NEEDS_ASSIST: NO
HISTORY OF FALLING IN THE LAST YEAR (PRIOR TO ADMISSION): NO
ADL_SCORE: 12
CHRONIC_PAIN_PRESENT: NO

## 2021-03-05 ASSESSMENT — COGNITIVE AND FUNCTIONAL STATUS - GENERAL
ARE YOU DEAF OR DO YOU HAVE SERIOUS DIFFICULTY  HEARING: NO
ARE YOU BLIND OR DO YOU HAVE SERIOUS DIFFICULTY SEEING, EVEN WHEN WEARING GLASSES: NO

## 2021-03-08 ENCOUNTER — LAB SERVICES (OUTPATIENT)
Dept: LAB | Age: 41
End: 2021-03-08

## 2021-03-08 DIAGNOSIS — Z01.812 PRE-PROCEDURAL LABORATORY EXAMINATION: ICD-10-CM

## 2021-03-08 DIAGNOSIS — F31.0 BIPOLAR AFFECTIVE DISORDER, CURRENT EPISODE HYPOMANIC (CMD): ICD-10-CM

## 2021-03-08 PROCEDURE — U0005 INFEC AGEN DETEC AMPLI PROBE: HCPCS | Performed by: CLINICAL MEDICAL LABORATORY

## 2021-03-08 PROCEDURE — U0003 INFECTIOUS AGENT DETECTION BY NUCLEIC ACID (DNA OR RNA); SEVERE ACUTE RESPIRATORY SYNDROME CORONAVIRUS 2 (SARS-COV-2) (CORONAVIRUS DISEASE [COVID-19]), AMPLIFIED PROBE TECHNIQUE, MAKING USE OF HIGH THROUGHPUT TECHNOLOGIES AS DESCRIBED BY CMS-2020-01-R: HCPCS | Performed by: CLINICAL MEDICAL LABORATORY

## 2021-03-08 RX ORDER — DIVALPROEX SODIUM 250 MG/1
TABLET, EXTENDED RELEASE ORAL
Qty: 30 TABLET | Refills: 3 | OUTPATIENT
Start: 2021-03-08

## 2021-03-09 ENCOUNTER — ANESTHESIA EVENT (OUTPATIENT)
Dept: SURGERY | Age: 41
End: 2021-03-09

## 2021-03-09 LAB
SARS-COV-2 RNA RESP QL NAA+PROBE: NOT DETECTED
SERVICE CMNT-IMP: NORMAL
SERVICE CMNT-IMP: NORMAL

## 2021-03-10 ENCOUNTER — HOSPITAL ENCOUNTER (OUTPATIENT)
Age: 41
Discharge: HOME | End: 2021-03-11
Attending: OTOLARYNGOLOGY | Admitting: OTOLARYNGOLOGY

## 2021-03-10 ENCOUNTER — ANESTHESIA (OUTPATIENT)
Dept: SURGERY | Age: 41
End: 2021-03-10

## 2021-03-10 DIAGNOSIS — E04.1 THYROID NODULE: ICD-10-CM

## 2021-03-10 PROCEDURE — 10002359 HB ANESTH GENERAL ADD'L 1/2 HR: Performed by: OTOLARYNGOLOGY

## 2021-03-10 PROCEDURE — 10004316 HB COUNTER-PREP

## 2021-03-10 PROCEDURE — 10002358 HB ANESTH GENERAL 1ST 1/2 HR: Performed by: OTOLARYNGOLOGY

## 2021-03-10 PROCEDURE — 10002801 HB RX 250 W/O HCPCS: Performed by: OTOLARYNGOLOGY

## 2021-03-10 PROCEDURE — 96372 THER/PROPH/DIAG INJ SC/IM: CPT | Performed by: NURSE ANESTHETIST, CERTIFIED REGISTERED

## 2021-03-10 PROCEDURE — 10004451 HB PACU RECOVERY 1ST 30 MINUTES: Performed by: OTOLARYNGOLOGY

## 2021-03-10 PROCEDURE — 88334 PATH CONSLTJ SURG CYTO XM EA: CPT | Performed by: OTOLARYNGOLOGY

## 2021-03-10 PROCEDURE — 10002807 HB RX 258: Performed by: NURSE ANESTHETIST, CERTIFIED REGISTERED

## 2021-03-10 PROCEDURE — 10002800 HB RX 250 W HCPCS: Performed by: OTOLARYNGOLOGY

## 2021-03-10 PROCEDURE — 60220 PARTIAL REMOVAL OF THYROID: CPT | Performed by: OTOLARYNGOLOGY

## 2021-03-10 PROCEDURE — 10002800 HB RX 250 W HCPCS: Performed by: NURSE ANESTHETIST, CERTIFIED REGISTERED

## 2021-03-10 PROCEDURE — 10006023 HB SUPPLY 272: Performed by: OTOLARYNGOLOGY

## 2021-03-10 PROCEDURE — 10004452 HB PACU ADDL 30 MINUTES: Performed by: OTOLARYNGOLOGY

## 2021-03-10 PROCEDURE — 10002117 HB ADDITIONAL SURGERY TIME/30 MIN: Performed by: OTOLARYNGOLOGY

## 2021-03-10 PROCEDURE — 10004348 HB COUNTER-EVAL PRE-OP

## 2021-03-10 PROCEDURE — 10002803 HB RX 637: Performed by: NURSE ANESTHETIST, CERTIFIED REGISTERED

## 2021-03-10 PROCEDURE — 10002803 HB RX 637: Performed by: OTOLARYNGOLOGY

## 2021-03-10 PROCEDURE — 60220 PARTIAL REMOVAL OF THYROID: CPT | Performed by: NURSE ANESTHETIST, CERTIFIED REGISTERED

## 2021-03-10 PROCEDURE — 10002801 HB RX 250 W/O HCPCS: Performed by: NURSE ANESTHETIST, CERTIFIED REGISTERED

## 2021-03-10 PROCEDURE — 88307 TISSUE EXAM BY PATHOLOGIST: CPT | Performed by: OTOLARYNGOLOGY

## 2021-03-10 PROCEDURE — 10002807 HB RX 258: Performed by: OTOLARYNGOLOGY

## 2021-03-10 PROCEDURE — 10006390 HB BASIC PROCEDURE: Performed by: OTOLARYNGOLOGY

## 2021-03-10 RX ORDER — HYDROCODONE BITARTRATE AND ACETAMINOPHEN 5; 325 MG/1; MG/1
1-2 TABLET ORAL EVERY 6 HOURS PRN
Qty: 30 TABLET | Refills: 0 | Status: SHIPPED | OUTPATIENT
Start: 2021-03-10 | End: 2021-03-29 | Stop reason: SINTOL

## 2021-03-10 RX ORDER — BACITRACIN ZINC AND POLYMYXIN B SULFATE 500; 1000 [USP'U]/G; [USP'U]/G
OINTMENT TOPICAL PRN
Status: DISCONTINUED | OUTPATIENT
Start: 2021-03-10 | End: 2021-03-10 | Stop reason: HOSPADM

## 2021-03-10 RX ORDER — PROPOFOL 10 MG/ML
INJECTION, EMULSION INTRAVENOUS PRN
Status: DISCONTINUED | OUTPATIENT
Start: 2021-03-10 | End: 2021-03-10

## 2021-03-10 RX ORDER — ROCURONIUM BROMIDE 10 MG/ML
INJECTION, SOLUTION INTRAVENOUS PRN
Status: DISCONTINUED | OUTPATIENT
Start: 2021-03-10 | End: 2021-03-10

## 2021-03-10 RX ORDER — LIDOCAINE HYDROCHLORIDE 10 MG/ML
5-10 INJECTION, SOLUTION INFILTRATION; PERINEURAL PRN
Status: DISCONTINUED | OUTPATIENT
Start: 2021-03-10 | End: 2021-03-10 | Stop reason: HOSPADM

## 2021-03-10 RX ORDER — FAMOTIDINE 10 MG/ML
20 INJECTION, SOLUTION INTRAVENOUS
Status: COMPLETED | OUTPATIENT
Start: 2021-03-10 | End: 2021-03-10

## 2021-03-10 RX ORDER — GLYCOPYRROLATE 0.2 MG/ML
0.2 INJECTION, SOLUTION INTRAMUSCULAR; INTRAVENOUS
Status: COMPLETED | OUTPATIENT
Start: 2021-03-10 | End: 2021-03-10

## 2021-03-10 RX ORDER — ALBUTEROL SULFATE 2.5 MG/3ML
2.5 SOLUTION RESPIRATORY (INHALATION) PRN
Status: DISCONTINUED | OUTPATIENT
Start: 2021-03-10 | End: 2021-03-10 | Stop reason: HOSPADM

## 2021-03-10 RX ORDER — 0.9 % SODIUM CHLORIDE 0.9 %
2 VIAL (ML) INJECTION EVERY 12 HOURS SCHEDULED
Status: DISCONTINUED | OUTPATIENT
Start: 2021-03-10 | End: 2021-03-10 | Stop reason: HOSPADM

## 2021-03-10 RX ORDER — PROCHLORPERAZINE EDISYLATE 5 MG/ML
2.5-5 INJECTION INTRAMUSCULAR; INTRAVENOUS
Status: DISCONTINUED | OUTPATIENT
Start: 2021-03-10 | End: 2021-03-10 | Stop reason: HOSPADM

## 2021-03-10 RX ORDER — ACETAMINOPHEN 325 MG/1
650 TABLET ORAL EVERY 4 HOURS PRN
Status: DISCONTINUED | OUTPATIENT
Start: 2021-03-10 | End: 2021-03-11 | Stop reason: HOSPADM

## 2021-03-10 RX ORDER — DIPHENHYDRAMINE HYDROCHLORIDE 50 MG/ML
25 INJECTION INTRAMUSCULAR; INTRAVENOUS
Status: DISCONTINUED | OUTPATIENT
Start: 2021-03-10 | End: 2021-03-10 | Stop reason: HOSPADM

## 2021-03-10 RX ORDER — MAGNESIUM HYDROXIDE 1200 MG/15ML
LIQUID ORAL PRN
Status: DISCONTINUED | OUTPATIENT
Start: 2021-03-10 | End: 2021-03-10 | Stop reason: HOSPADM

## 2021-03-10 RX ORDER — CITRIC ACID/SODIUM CITRATE 334-500MG
30 SOLUTION, ORAL ORAL
Status: COMPLETED | OUTPATIENT
Start: 2021-03-10 | End: 2021-03-10

## 2021-03-10 RX ORDER — ACETAMINOPHEN 650 MG/1
650 SUPPOSITORY RECTAL EVERY 4 HOURS PRN
Status: DISCONTINUED | OUTPATIENT
Start: 2021-03-10 | End: 2021-03-11 | Stop reason: HOSPADM

## 2021-03-10 RX ORDER — CEFAZOLIN SODIUM 1 G/3ML
INJECTION, POWDER, FOR SOLUTION INTRAMUSCULAR; INTRAVENOUS PRN
Status: DISCONTINUED | OUTPATIENT
Start: 2021-03-10 | End: 2021-03-10

## 2021-03-10 RX ORDER — ONDANSETRON 4 MG/1
4 TABLET, ORALLY DISINTEGRATING ORAL EVERY 12 HOURS PRN
Status: DISCONTINUED | OUTPATIENT
Start: 2021-03-10 | End: 2021-03-11 | Stop reason: HOSPADM

## 2021-03-10 RX ORDER — HYDROCODONE BITARTRATE AND ACETAMINOPHEN 5; 325 MG/1; MG/1
1-2 TABLET ORAL EVERY 4 HOURS PRN
Status: DISCONTINUED | OUTPATIENT
Start: 2021-03-10 | End: 2021-03-11 | Stop reason: HOSPADM

## 2021-03-10 RX ORDER — ONDANSETRON 2 MG/ML
4 INJECTION INTRAMUSCULAR; INTRAVENOUS EVERY 12 HOURS PRN
Status: DISCONTINUED | OUTPATIENT
Start: 2021-03-10 | End: 2021-03-11 | Stop reason: HOSPADM

## 2021-03-10 RX ORDER — PHENYLEPHRINE HYDROCHLORIDE 10 MG/ML
INJECTION, SOLUTION INTRAMUSCULAR; INTRAVENOUS; SUBCUTANEOUS PRN
Status: DISCONTINUED | OUTPATIENT
Start: 2021-03-10 | End: 2021-03-10

## 2021-03-10 RX ORDER — CEPHALEXIN 500 MG/1
500 CAPSULE ORAL 3 TIMES DAILY
Qty: 21 CAPSULE | Refills: 0 | Status: SHIPPED | OUTPATIENT
Start: 2021-03-10 | End: 2021-03-18 | Stop reason: ALTCHOICE

## 2021-03-10 RX ORDER — MIDAZOLAM HYDROCHLORIDE 1 MG/ML
INJECTION, SOLUTION INTRAMUSCULAR; INTRAVENOUS PRN
Status: DISCONTINUED | OUTPATIENT
Start: 2021-03-10 | End: 2021-03-10

## 2021-03-10 RX ORDER — DEXAMETHASONE SODIUM PHOSPHATE 4 MG/ML
INJECTION, SOLUTION INTRA-ARTICULAR; INTRALESIONAL; INTRAMUSCULAR; INTRAVENOUS; SOFT TISSUE PRN
Status: DISCONTINUED | OUTPATIENT
Start: 2021-03-10 | End: 2021-03-10

## 2021-03-10 RX ORDER — HYDRALAZINE HYDROCHLORIDE 20 MG/ML
5 INJECTION INTRAMUSCULAR; INTRAVENOUS EVERY 10 MIN PRN
Status: DISCONTINUED | OUTPATIENT
Start: 2021-03-10 | End: 2021-03-10 | Stop reason: HOSPADM

## 2021-03-10 RX ORDER — ONDANSETRON 2 MG/ML
INJECTION INTRAMUSCULAR; INTRAVENOUS PRN
Status: DISCONTINUED | OUTPATIENT
Start: 2021-03-10 | End: 2021-03-10

## 2021-03-10 RX ORDER — SODIUM CHLORIDE, SODIUM LACTATE, POTASSIUM CHLORIDE, CALCIUM CHLORIDE 600; 310; 30; 20 MG/100ML; MG/100ML; MG/100ML; MG/100ML
INJECTION, SOLUTION INTRAVENOUS CONTINUOUS
Status: DISCONTINUED | OUTPATIENT
Start: 2021-03-10 | End: 2021-03-11 | Stop reason: HOSPADM

## 2021-03-10 RX ORDER — MIDAZOLAM HYDROCHLORIDE 1 MG/ML
1-2 INJECTION, SOLUTION INTRAMUSCULAR; INTRAVENOUS
Status: DISCONTINUED | OUTPATIENT
Start: 2021-03-10 | End: 2021-03-10 | Stop reason: HOSPADM

## 2021-03-10 RX ORDER — LIDOCAINE HYDROCHLORIDE 20 MG/ML
INJECTION, SOLUTION INFILTRATION; PERINEURAL PRN
Status: DISCONTINUED | OUTPATIENT
Start: 2021-03-10 | End: 2021-03-10

## 2021-03-10 RX ADMIN — ONDANSETRON 4 MG: 2 INJECTION INTRAMUSCULAR; INTRAVENOUS at 21:38

## 2021-03-10 RX ADMIN — SODIUM CHLORIDE, POTASSIUM CHLORIDE, SODIUM LACTATE AND CALCIUM CHLORIDE: 600; 310; 30; 20 INJECTION, SOLUTION INTRAVENOUS at 14:06

## 2021-03-10 RX ADMIN — HYDROCODONE BITARTRATE AND ACETAMINOPHEN 1 TABLET: 5; 325 TABLET ORAL at 16:48

## 2021-03-10 RX ADMIN — CEFAZOLIN SODIUM 2000 MG: 300 INJECTION, POWDER, LYOPHILIZED, FOR SOLUTION INTRAVENOUS at 21:38

## 2021-03-10 RX ADMIN — CEFAZOLIN SODIUM 2 G: 1 INJECTION, POWDER, FOR SOLUTION INTRAMUSCULAR; INTRAVENOUS at 12:51

## 2021-03-10 RX ADMIN — SODIUM CHLORIDE, POTASSIUM CHLORIDE, SODIUM LACTATE AND CALCIUM CHLORIDE: 600; 310; 30; 20 INJECTION, SOLUTION INTRAVENOUS at 10:53

## 2021-03-10 RX ADMIN — GLYCOPYRROLATE 0.2 MG: 0.2 INJECTION, SOLUTION INTRAMUSCULAR; INTRAVENOUS at 10:59

## 2021-03-10 RX ADMIN — FENTANYL CITRATE 50 MCG: 50 INJECTION INTRAMUSCULAR; INTRAVENOUS at 12:45

## 2021-03-10 RX ADMIN — ROCURONIUM BROMIDE 5 MG: 10 INJECTION INTRAVENOUS at 12:48

## 2021-03-10 RX ADMIN — FENTANYL CITRATE 50 MCG: 50 INJECTION INTRAMUSCULAR; INTRAVENOUS at 14:28

## 2021-03-10 RX ADMIN — PHENYLEPHRINE HYDROCHLORIDE 100 MCG: 10 INJECTION INTRAVENOUS at 14:02

## 2021-03-10 RX ADMIN — FENTANYL CITRATE 50 MCG: 50 INJECTION INTRAMUSCULAR; INTRAVENOUS at 13:24

## 2021-03-10 RX ADMIN — SUCCINYLCHOLINE CHLORIDE 100 MG: 20 INJECTION, SOLUTION INTRAMUSCULAR; INTRAVENOUS at 12:49

## 2021-03-10 RX ADMIN — FENTANYL CITRATE 50 MCG: 50 INJECTION INTRAMUSCULAR; INTRAVENOUS at 12:48

## 2021-03-10 RX ADMIN — SODIUM CITRATE AND CITRIC ACID MONOHYDRATE 30 ML: 500; 334 SOLUTION ORAL at 11:00

## 2021-03-10 RX ADMIN — PROPOFOL 200 MG: 10 INJECTION, EMULSION INTRAVENOUS at 12:48

## 2021-03-10 RX ADMIN — ONDANSETRON 4 MG: 2 SOLUTION INTRAMUSCULAR; INTRAVENOUS at 14:29

## 2021-03-10 RX ADMIN — LIDOCAINE HYDROCHLORIDE 5 ML: 20 INJECTION, SOLUTION INFILTRATION; PERINEURAL at 12:48

## 2021-03-10 RX ADMIN — DEXAMETHASONE SODIUM PHOSPHATE 4 MG: 4 INJECTION, SOLUTION INTRAMUSCULAR; INTRAVENOUS at 12:55

## 2021-03-10 RX ADMIN — MIDAZOLAM HYDROCHLORIDE 2 MG: 1 INJECTION, SOLUTION INTRAMUSCULAR; INTRAVENOUS at 12:45

## 2021-03-10 RX ADMIN — FAMOTIDINE 20 MG: 10 INJECTION INTRAVENOUS at 10:59

## 2021-03-10 SDOH — HEALTH STABILITY: MENTAL HEALTH: HOW OFTEN DO YOU HAVE A DRINK CONTAINING ALCOHOL?: NEVER

## 2021-03-10 ASSESSMENT — PAIN SCALES - GENERAL
PAINLEVEL_OUTOF10: 4
PAINLEVEL_OUTOF10: 3
PAINLEVEL_OUTOF10: 5
PAINLEVEL_OUTOF10: 3
PAINLEVEL_OUTOF10: 5
PAINLEVEL_OUTOF10: 0
PAINLEVEL_OUTOF10: 0
PAINLEVEL_OUTOF10: 3
PAINLEVEL_OUTOF10: 0
PAINLEVEL_OUTOF10: 6
PAINLEVEL_OUTOF10: 0

## 2021-03-10 ASSESSMENT — COGNITIVE AND FUNCTIONAL STATUS - GENERAL
DO YOU HAVE SERIOUS DIFFICULTY WALKING OR CLIMBING STAIRS: NO
ARE YOU DEAF OR DO YOU HAVE SERIOUS DIFFICULTY  HEARING: NO
DO YOU HAVE DIFFICULTY DRESSING OR BATHING: NO
BECAUSE OF A PHYSICAL, MENTAL, OR EMOTIONAL CONDITION, DO YOU HAVE DIFFICULTY DOING ERRANDS ALONE: NO
BECAUSE OF A PHYSICAL, MENTAL, OR EMOTIONAL CONDITION, DO YOU HAVE SERIOUS DIFFICULTY CONCENTRATING, REMEMBERING OR MAKING DECISIONS: NO
ARE YOU BLIND OR DO YOU HAVE SERIOUS DIFFICULTY SEEING, EVEN WHEN WEARING GLASSES: NO

## 2021-03-10 ASSESSMENT — ACTIVITIES OF DAILY LIVING (ADL)
SENSORY_SUPPORT_DEVICES: EYEGLASSES
NEEDS_ASSIST: NO
HISTORY OF FALLING IN THE LAST YEAR (PRIOR TO ADMISSION): NO
ADL_SCORE: 12
ADL_SHORT_OF_BREATH: NO
ADL_BEFORE_ADMISSION: INDEPENDENT
RECENT_DECLINE_ADL: NO
CHRONIC_PAIN_PRESENT: NO

## 2021-03-11 PROCEDURE — 10002807 HB RX 258: Performed by: OTOLARYNGOLOGY

## 2021-03-11 PROCEDURE — 99024 POSTOP FOLLOW-UP VISIT: CPT | Performed by: OTOLARYNGOLOGY

## 2021-03-11 PROCEDURE — 10004651 HB RX, NO CHARGE ITEM: Performed by: OTOLARYNGOLOGY

## 2021-03-11 PROCEDURE — 10002800 HB RX 250 W HCPCS: Performed by: OTOLARYNGOLOGY

## 2021-03-11 PROCEDURE — 10002767 HB EXTENDED RECOVERY PER HOUR

## 2021-03-11 RX ADMIN — CEFAZOLIN SODIUM 2000 MG: 300 INJECTION, POWDER, LYOPHILIZED, FOR SOLUTION INTRAVENOUS at 05:50

## 2021-03-11 RX ADMIN — ACETAMINOPHEN 650 MG: 325 TABLET, FILM COATED ORAL at 00:42

## 2021-03-11 ASSESSMENT — PAIN SCALES - GENERAL
PAINLEVEL_OUTOF10: 2
PAINLEVEL_OUTOF10: 4
PAINLEVEL_OUTOF10: 4
PAINLEVEL_OUTOF10: 5

## 2021-03-12 LAB
ASR DISCLAIMER: NORMAL
CASE RPRT: NORMAL
CLINICAL INFO: NORMAL
PATH REPORT.FINAL DX SPEC: NORMAL
PATH REPORT.GROSS SPEC: NORMAL
PATH REPORT.INTRAOP OBS SPEC DOC: NORMAL
PATH REPORT.MICROSCOPIC SPEC OTHER STN: NORMAL

## 2021-03-18 ENCOUNTER — OFFICE VISIT (OUTPATIENT)
Dept: OTOLARYNGOLOGY | Age: 41
End: 2021-03-18

## 2021-03-18 ENCOUNTER — LAB SERVICES (OUTPATIENT)
Dept: LAB | Age: 41
End: 2021-03-18

## 2021-03-18 ENCOUNTER — APPOINTMENT (OUTPATIENT)
Dept: OTOLARYNGOLOGY | Age: 41
End: 2021-03-18

## 2021-03-18 DIAGNOSIS — E04.1 THYROID NODULE: ICD-10-CM

## 2021-03-18 DIAGNOSIS — E89.0 S/P THYROIDECTOMY: Primary | ICD-10-CM

## 2021-03-18 DIAGNOSIS — Z48.89 POSTOPERATIVE VISIT: ICD-10-CM

## 2021-03-18 DIAGNOSIS — Z98.890 S/P REMOVAL OF THYROID NODULE: ICD-10-CM

## 2021-03-18 LAB — TSH SERPL-ACNC: 3.21 MCUNITS/ML (ref 0.35–5)

## 2021-03-18 PROCEDURE — 99024 POSTOP FOLLOW-UP VISIT: CPT | Performed by: OTOLARYNGOLOGY

## 2021-03-18 PROCEDURE — 84443 ASSAY THYROID STIM HORMONE: CPT | Performed by: CLINICAL MEDICAL LABORATORY

## 2021-03-18 PROCEDURE — 36415 COLL VENOUS BLD VENIPUNCTURE: CPT | Performed by: INTERNAL MEDICINE

## 2021-03-18 SDOH — HEALTH STABILITY: MENTAL HEALTH: HOW OFTEN DO YOU HAVE A DRINK CONTAINING ALCOHOL?: NEVER

## 2021-03-20 DIAGNOSIS — F31.0 BIPOLAR AFFECTIVE DISORDER, CURRENT EPISODE HYPOMANIC (CMD): ICD-10-CM

## 2021-03-23 RX ORDER — DIVALPROEX SODIUM 250 MG/1
250 TABLET, EXTENDED RELEASE ORAL 2 TIMES DAILY
Qty: 60 TABLET | Refills: 1 | Status: SHIPPED | OUTPATIENT
Start: 2021-03-23 | End: 2021-05-03

## 2021-03-25 ENCOUNTER — TELEPHONE (OUTPATIENT)
Dept: OTOLARYNGOLOGY | Age: 41
End: 2021-03-25

## 2021-03-29 ENCOUNTER — LAB SERVICES (OUTPATIENT)
Dept: LAB | Age: 41
End: 2021-03-29
Attending: SURGERY

## 2021-03-29 DIAGNOSIS — K40.90 LEFT INGUINAL HERNIA: ICD-10-CM

## 2021-03-29 LAB
SARS-COV-2 RNA RESP QL NAA+PROBE: NOT DETECTED
SERVICE CMNT-IMP: NORMAL
SERVICE CMNT-IMP: NORMAL

## 2021-03-29 PROCEDURE — U0005 INFEC AGEN DETEC AMPLI PROBE: HCPCS

## 2021-03-29 ASSESSMENT — ACTIVITIES OF DAILY LIVING (ADL)
NEEDS_ASSIST: NO
RECENT_DECLINE_ADL: NO
ADL_SHORT_OF_BREATH: NO
ADL_BEFORE_ADMISSION: INDEPENDENT
HISTORY OF FALLING IN THE LAST YEAR (PRIOR TO ADMISSION): NO
SENSORY_SUPPORT_DEVICES: EYEGLASSES
ADL_SCORE: 12

## 2021-03-29 ASSESSMENT — COGNITIVE AND FUNCTIONAL STATUS - GENERAL
ARE YOU BLIND OR DO YOU HAVE SERIOUS DIFFICULTY SEEING, EVEN WHEN WEARING GLASSES: NO
ARE YOU DEAF OR DO YOU HAVE SERIOUS DIFFICULTY  HEARING: NO

## 2021-03-31 ENCOUNTER — ANESTHESIA EVENT (OUTPATIENT)
Dept: SURGERY | Age: 41
End: 2021-03-31

## 2021-04-01 ENCOUNTER — ANESTHESIA (OUTPATIENT)
Dept: SURGERY | Age: 41
End: 2021-04-01

## 2021-04-01 ENCOUNTER — HOSPITAL ENCOUNTER (OUTPATIENT)
Age: 41
Discharge: HOME | End: 2021-04-01
Attending: SURGERY | Admitting: SURGERY

## 2021-04-01 DIAGNOSIS — K40.30 INCARCERATED LEFT INGUINAL HERNIA: ICD-10-CM

## 2021-04-01 PROCEDURE — 10002359 HB ANESTH GENERAL ADD'L 1/2 HR: Performed by: SURGERY

## 2021-04-01 PROCEDURE — 10002358 HB ANESTH GENERAL 1ST 1/2 HR: Performed by: SURGERY

## 2021-04-01 PROCEDURE — 10002803 HB RX 637: Performed by: NURSE ANESTHETIST, CERTIFIED REGISTERED

## 2021-04-01 PROCEDURE — 10002800 HB RX 250 W HCPCS: Performed by: ANESTHESIOLOGY

## 2021-04-01 PROCEDURE — 10002807 HB RX 258: Performed by: NURSE ANESTHETIST, CERTIFIED REGISTERED

## 2021-04-01 PROCEDURE — C1781 MESH (IMPLANTABLE): HCPCS | Performed by: SURGERY

## 2021-04-01 PROCEDURE — 10002801 HB RX 250 W/O HCPCS: Performed by: NURSE ANESTHETIST, CERTIFIED REGISTERED

## 2021-04-01 PROCEDURE — 10004348 HB COUNTER-EVAL PRE-OP

## 2021-04-01 PROCEDURE — 49650 LAP ING HERNIA REPAIR INIT: CPT | Performed by: SURGERY

## 2021-04-01 PROCEDURE — 10004560 HB COUNTER-EXTENDED RECOVERY PER HOUR

## 2021-04-01 PROCEDURE — 10002801 HB RX 250 W/O HCPCS: Performed by: SURGERY

## 2021-04-01 PROCEDURE — 10006023 HB SUPPLY 272: Performed by: SURGERY

## 2021-04-01 PROCEDURE — 10004451 HB PACU RECOVERY 1ST 30 MINUTES: Performed by: SURGERY

## 2021-04-01 PROCEDURE — 10002803 HB RX 637: Performed by: SURGERY

## 2021-04-01 PROCEDURE — 10002767 HB EXTENDED RECOVERY PER HOUR: Performed by: SURGERY

## 2021-04-01 PROCEDURE — 10006393 HB GENERAL ROBOTIC: Performed by: SURGERY

## 2021-04-01 PROCEDURE — 10006027 HB SUPPLY 278: Performed by: SURGERY

## 2021-04-01 PROCEDURE — 10002117 HB ADDITIONAL SURGERY TIME/30 MIN: Performed by: SURGERY

## 2021-04-01 PROCEDURE — 10004316 HB COUNTER-PREP

## 2021-04-01 PROCEDURE — 49650 LAP ING HERNIA REPAIR INIT: CPT | Performed by: NURSE ANESTHETIST, CERTIFIED REGISTERED

## 2021-04-01 PROCEDURE — 96372 THER/PROPH/DIAG INJ SC/IM: CPT | Performed by: NURSE ANESTHETIST, CERTIFIED REGISTERED

## 2021-04-01 PROCEDURE — 10002800 HB RX 250 W HCPCS: Performed by: NURSE ANESTHETIST, CERTIFIED REGISTERED

## 2021-04-01 PROCEDURE — 10004452 HB PACU ADDL 30 MINUTES: Performed by: SURGERY

## 2021-04-01 DEVICE — LAPAROSCOPIC SELF-FIXATING MESH, LEFT ANATOMICAL
Type: IMPLANTABLE DEVICE | Site: ABDOMEN | Status: FUNCTIONAL
Brand: PROGRIP

## 2021-04-01 RX ORDER — LIDOCAINE HYDROCHLORIDE 10 MG/ML
5-10 INJECTION, SOLUTION INFILTRATION; PERINEURAL PRN
Status: DISCONTINUED | OUTPATIENT
Start: 2021-04-01 | End: 2021-04-01 | Stop reason: HOSPADM

## 2021-04-01 RX ORDER — ROCURONIUM BROMIDE 10 MG/ML
INJECTION, SOLUTION INTRAVENOUS PRN
Status: DISCONTINUED | OUTPATIENT
Start: 2021-04-01 | End: 2021-04-01

## 2021-04-01 RX ORDER — ALBUTEROL SULFATE 2.5 MG/3ML
2.5 SOLUTION RESPIRATORY (INHALATION) PRN
Status: DISCONTINUED | OUTPATIENT
Start: 2021-04-01 | End: 2021-04-01 | Stop reason: HOSPADM

## 2021-04-01 RX ORDER — HYDRALAZINE HYDROCHLORIDE 20 MG/ML
5 INJECTION INTRAMUSCULAR; INTRAVENOUS EVERY 10 MIN PRN
Status: DISCONTINUED | OUTPATIENT
Start: 2021-04-01 | End: 2021-04-01 | Stop reason: HOSPADM

## 2021-04-01 RX ORDER — GLYCOPYRROLATE 0.2 MG/ML
0.2 INJECTION, SOLUTION INTRAMUSCULAR; INTRAVENOUS
Status: COMPLETED | OUTPATIENT
Start: 2021-04-01 | End: 2021-04-01

## 2021-04-01 RX ORDER — ONDANSETRON 4 MG/1
4 TABLET, ORALLY DISINTEGRATING ORAL EVERY 12 HOURS PRN
Status: DISCONTINUED | OUTPATIENT
Start: 2021-04-01 | End: 2021-04-01 | Stop reason: HOSPADM

## 2021-04-01 RX ORDER — LIDOCAINE HYDROCHLORIDE 20 MG/ML
INJECTION, SOLUTION INFILTRATION; PERINEURAL PRN
Status: DISCONTINUED | OUTPATIENT
Start: 2021-04-01 | End: 2021-04-01

## 2021-04-01 RX ORDER — DEXAMETHASONE SODIUM PHOSPHATE 4 MG/ML
INJECTION, SOLUTION INTRA-ARTICULAR; INTRALESIONAL; INTRAMUSCULAR; INTRAVENOUS; SOFT TISSUE PRN
Status: DISCONTINUED | OUTPATIENT
Start: 2021-04-01 | End: 2021-04-01

## 2021-04-01 RX ORDER — SODIUM CHLORIDE, SODIUM LACTATE, POTASSIUM CHLORIDE, CALCIUM CHLORIDE 600; 310; 30; 20 MG/100ML; MG/100ML; MG/100ML; MG/100ML
INJECTION, SOLUTION INTRAVENOUS CONTINUOUS
Status: DISCONTINUED | OUTPATIENT
Start: 2021-04-01 | End: 2021-04-01 | Stop reason: HOSPADM

## 2021-04-01 RX ORDER — APREPITANT 40 MG/1
40 CAPSULE ORAL DAILY
Status: DISCONTINUED | OUTPATIENT
Start: 2021-04-01 | End: 2021-04-01 | Stop reason: HOSPADM

## 2021-04-01 RX ORDER — DEXTROSE MONOHYDRATE 50 MG/ML
INJECTION, SOLUTION INTRAVENOUS CONTINUOUS PRN
Status: DISCONTINUED | OUTPATIENT
Start: 2021-04-01 | End: 2021-04-01 | Stop reason: HOSPADM

## 2021-04-01 RX ORDER — MIDAZOLAM HYDROCHLORIDE 1 MG/ML
1-2 INJECTION, SOLUTION INTRAMUSCULAR; INTRAVENOUS
Status: DISCONTINUED | OUTPATIENT
Start: 2021-04-01 | End: 2021-04-01 | Stop reason: HOSPADM

## 2021-04-01 RX ORDER — CITRIC ACID/SODIUM CITRATE 334-500MG
30 SOLUTION, ORAL ORAL
Status: COMPLETED | OUTPATIENT
Start: 2021-04-01 | End: 2021-04-01

## 2021-04-01 RX ORDER — PROCHLORPERAZINE EDISYLATE 5 MG/ML
2.5-5 INJECTION INTRAMUSCULAR; INTRAVENOUS
Status: DISCONTINUED | OUTPATIENT
Start: 2021-04-01 | End: 2021-04-01 | Stop reason: HOSPADM

## 2021-04-01 RX ORDER — TRAMADOL HYDROCHLORIDE 50 MG/1
50-100 TABLET ORAL EVERY 8 HOURS PRN
Qty: 12 TABLET | Refills: 0 | Status: SHIPPED | OUTPATIENT
Start: 2021-04-01 | End: 2021-05-13 | Stop reason: CLARIF

## 2021-04-01 RX ORDER — ONDANSETRON 2 MG/ML
INJECTION INTRAMUSCULAR; INTRAVENOUS PRN
Status: DISCONTINUED | OUTPATIENT
Start: 2021-04-01 | End: 2021-04-01

## 2021-04-01 RX ORDER — DIPHENHYDRAMINE HYDROCHLORIDE 50 MG/ML
25 INJECTION INTRAMUSCULAR; INTRAVENOUS
Status: DISCONTINUED | OUTPATIENT
Start: 2021-04-01 | End: 2021-04-01 | Stop reason: HOSPADM

## 2021-04-01 RX ORDER — PROPOFOL 10 MG/ML
INJECTION, EMULSION INTRAVENOUS PRN
Status: DISCONTINUED | OUTPATIENT
Start: 2021-04-01 | End: 2021-04-01

## 2021-04-01 RX ORDER — TRAMADOL HYDROCHLORIDE 50 MG/1
100 TABLET ORAL EVERY 8 HOURS PRN
Status: DISCONTINUED | OUTPATIENT
Start: 2021-04-01 | End: 2021-04-01 | Stop reason: HOSPADM

## 2021-04-01 RX ORDER — MIDAZOLAM HYDROCHLORIDE 1 MG/ML
INJECTION, SOLUTION INTRAMUSCULAR; INTRAVENOUS PRN
Status: DISCONTINUED | OUTPATIENT
Start: 2021-04-01 | End: 2021-04-01

## 2021-04-01 RX ORDER — TRAMADOL HYDROCHLORIDE 50 MG/1
50 TABLET ORAL EVERY 6 HOURS PRN
Status: DISCONTINUED | OUTPATIENT
Start: 2021-04-01 | End: 2021-04-01 | Stop reason: SDUPTHER

## 2021-04-01 RX ORDER — 0.9 % SODIUM CHLORIDE 0.9 %
2 VIAL (ML) INJECTION EVERY 12 HOURS SCHEDULED
Status: DISCONTINUED | OUTPATIENT
Start: 2021-04-01 | End: 2021-04-01 | Stop reason: HOSPADM

## 2021-04-01 RX ORDER — FAMOTIDINE 10 MG/ML
20 INJECTION, SOLUTION INTRAVENOUS
Status: COMPLETED | OUTPATIENT
Start: 2021-04-01 | End: 2021-04-01

## 2021-04-01 RX ORDER — BUPIVACAINE HYDROCHLORIDE AND EPINEPHRINE 2.5; 5 MG/ML; UG/ML
INJECTION, SOLUTION EPIDURAL; INFILTRATION; INTRACAUDAL; PERINEURAL PRN
Status: DISCONTINUED | OUTPATIENT
Start: 2021-04-01 | End: 2021-04-01 | Stop reason: HOSPADM

## 2021-04-01 RX ORDER — IBUPROFEN 200 MG
400 TABLET ORAL EVERY 8 HOURS PRN
Status: DISCONTINUED | OUTPATIENT
Start: 2021-04-01 | End: 2021-04-01 | Stop reason: HOSPADM

## 2021-04-01 RX ORDER — MAGNESIUM HYDROXIDE 1200 MG/15ML
LIQUID ORAL PRN
Status: DISCONTINUED | OUTPATIENT
Start: 2021-04-01 | End: 2021-04-01 | Stop reason: HOSPADM

## 2021-04-01 RX ORDER — CEFAZOLIN SODIUM 1 G/3ML
INJECTION, POWDER, FOR SOLUTION INTRAMUSCULAR; INTRAVENOUS PRN
Status: DISCONTINUED | OUTPATIENT
Start: 2021-04-01 | End: 2021-04-01

## 2021-04-01 RX ORDER — ONDANSETRON 2 MG/ML
4 INJECTION INTRAMUSCULAR; INTRAVENOUS EVERY 12 HOURS PRN
Status: DISCONTINUED | OUTPATIENT
Start: 2021-04-01 | End: 2021-04-01 | Stop reason: HOSPADM

## 2021-04-01 RX ADMIN — LIDOCAINE HYDROCHLORIDE 5 ML: 20 INJECTION, SOLUTION INFILTRATION; PERINEURAL at 07:29

## 2021-04-01 RX ADMIN — FENTANYL CITRATE 100 MCG: 50 INJECTION INTRAMUSCULAR; INTRAVENOUS at 07:29

## 2021-04-01 RX ADMIN — SODIUM CITRATE AND CITRIC ACID MONOHYDRATE 30 ML: 500; 334 SOLUTION ORAL at 06:52

## 2021-04-01 RX ADMIN — APREPITANT 40 MG: 40 CAPSULE ORAL at 06:52

## 2021-04-01 RX ADMIN — ONDANSETRON 4 MG: 2 SOLUTION INTRAMUSCULAR; INTRAVENOUS at 07:40

## 2021-04-01 RX ADMIN — ROCURONIUM BROMIDE 50 MG: 10 INJECTION INTRAVENOUS at 07:29

## 2021-04-01 RX ADMIN — PROPOFOL 200 MG: 10 INJECTION, EMULSION INTRAVENOUS at 07:29

## 2021-04-01 RX ADMIN — TRAMADOL HYDROCHLORIDE 50 MG: 50 TABLET, FILM COATED ORAL at 10:35

## 2021-04-01 RX ADMIN — GLYCOPYRROLATE 0.2 MG: 0.2 INJECTION, SOLUTION INTRAMUSCULAR; INTRAVENOUS at 06:53

## 2021-04-01 RX ADMIN — HYDROMORPHONE HYDROCHLORIDE 0.5 MG: 1 INJECTION, SOLUTION INTRAMUSCULAR; INTRAVENOUS; SUBCUTANEOUS at 07:43

## 2021-04-01 RX ADMIN — DEXAMETHASONE SODIUM PHOSPHATE 4 MG: 4 INJECTION, SOLUTION INTRAMUSCULAR; INTRAVENOUS at 07:40

## 2021-04-01 RX ADMIN — SODIUM CHLORIDE, POTASSIUM CHLORIDE, SODIUM LACTATE AND CALCIUM CHLORIDE: 600; 310; 30; 20 INJECTION, SOLUTION INTRAVENOUS at 07:57

## 2021-04-01 RX ADMIN — TRAMADOL HYDROCHLORIDE 50 MG: 50 TABLET, FILM COATED ORAL at 11:43

## 2021-04-01 RX ADMIN — SUGAMMADEX 200 MG: 100 INJECTION, SOLUTION INTRAVENOUS at 08:41

## 2021-04-01 RX ADMIN — ALBUTEROL SULFATE 2.5 MG: 2.5 SOLUTION RESPIRATORY (INHALATION) at 08:55

## 2021-04-01 RX ADMIN — CEFAZOLIN SODIUM 3 G: 1 INJECTION, POWDER, FOR SOLUTION INTRAMUSCULAR; INTRAVENOUS at 07:32

## 2021-04-01 RX ADMIN — FAMOTIDINE 20 MG: 10 INJECTION INTRAVENOUS at 06:55

## 2021-04-01 RX ADMIN — SODIUM CHLORIDE, POTASSIUM CHLORIDE, SODIUM LACTATE AND CALCIUM CHLORIDE: 600; 310; 30; 20 INJECTION, SOLUTION INTRAVENOUS at 06:51

## 2021-04-01 RX ADMIN — MIDAZOLAM HYDROCHLORIDE 2 MG: 1 INJECTION, SOLUTION INTRAMUSCULAR; INTRAVENOUS at 07:27

## 2021-04-01 RX ADMIN — HYDROMORPHONE HYDROCHLORIDE 0.5 MG: 1 INJECTION, SOLUTION INTRAMUSCULAR; INTRAVENOUS; SUBCUTANEOUS at 08:42

## 2021-04-01 RX ADMIN — ROCURONIUM BROMIDE 10 MG: 10 INJECTION INTRAVENOUS at 08:15

## 2021-04-01 ASSESSMENT — ACTIVITIES OF DAILY LIVING (ADL)
ADL_SHORT_OF_BREATH: NO
SENSORY_SUPPORT_DEVICES: EYEGLASSES
NEEDS_ASSIST: NO
HISTORY OF FALLING IN THE LAST YEAR (PRIOR TO ADMISSION): NO
RECENT_DECLINE_ADL: NO
DESCRIBE HOW PAIN IMPACTS YOUR LIFE: MOBILITY;ENERGY LEVEL
CHRONIC_PAIN_PRESENT: YES, CHRONIC
ADL_BEFORE_ADMISSION: INDEPENDENT
ADL_SCORE: 12

## 2021-04-01 ASSESSMENT — COGNITIVE AND FUNCTIONAL STATUS - GENERAL
ARE YOU BLIND OR DO YOU HAVE SERIOUS DIFFICULTY SEEING, EVEN WHEN WEARING GLASSES: YES
ARE YOU DEAF OR DO YOU HAVE SERIOUS DIFFICULTY  HEARING: YES

## 2021-04-01 ASSESSMENT — PAIN SCALES - GENERAL
PAINLEVEL_OUTOF10: 5
PAINLEVEL_OUTOF10: 4
PAINLEVEL_OUTOF10: 5
PAINLEVEL_OUTOF10: 3
PAINLEVEL_OUTOF10: 0
PAINLEVEL_OUTOF10: 0
PAINLEVEL_OUTOF10: 4
PAINLEVEL_OUTOF10: 4
PAINLEVEL_OUTOF10: 0
PAINLEVEL_OUTOF10: 4
PAINLEVEL_OUTOF10: 5

## 2021-04-09 DIAGNOSIS — F31.0 BIPOLAR AFFECTIVE DISORDER, CURRENT EPISODE HYPOMANIC (CMD): ICD-10-CM

## 2021-04-12 RX ORDER — QUETIAPINE FUMARATE 50 MG/1
TABLET, FILM COATED ORAL
Qty: 60 TABLET | Refills: 2 | Status: SHIPPED | OUTPATIENT
Start: 2021-04-12 | End: 2021-07-16

## 2021-04-21 ENCOUNTER — OFFICE VISIT (OUTPATIENT)
Dept: SURGERY | Age: 41
End: 2021-04-21

## 2021-04-21 VITALS — HEART RATE: 72 BPM | DIASTOLIC BLOOD PRESSURE: 78 MMHG | RESPIRATION RATE: 18 BRPM | SYSTOLIC BLOOD PRESSURE: 110 MMHG

## 2021-04-21 DIAGNOSIS — Z09 SURGICAL FOLLOW-UP CARE: Primary | ICD-10-CM

## 2021-04-21 PROCEDURE — 99024 POSTOP FOLLOW-UP VISIT: CPT | Performed by: SURGERY

## 2021-04-28 DIAGNOSIS — J30.89 NON-SEASONAL ALLERGIC RHINITIS DUE TO OTHER ALLERGIC TRIGGER: ICD-10-CM

## 2021-04-28 RX ORDER — HYDROXYZINE 50 MG/1
50 TABLET, FILM COATED ORAL NIGHTLY PRN
Qty: 90 TABLET | Refills: 1 | Status: SHIPPED | OUTPATIENT
Start: 2021-04-28 | End: 2021-11-26

## 2021-05-03 DIAGNOSIS — F31.0 BIPOLAR AFFECTIVE DISORDER, CURRENT EPISODE HYPOMANIC (CMD): ICD-10-CM

## 2021-05-03 RX ORDER — DIVALPROEX SODIUM 250 MG/1
250 TABLET, EXTENDED RELEASE ORAL 2 TIMES DAILY
Qty: 60 TABLET | Refills: 0 | Status: SHIPPED | OUTPATIENT
Start: 2021-05-03 | End: 2021-06-01

## 2021-05-12 PROBLEM — E01.0 THYROMEGALY: Status: RESOLVED | Noted: 2021-01-13 | Resolved: 2021-05-12

## 2021-05-13 ENCOUNTER — OFFICE VISIT (OUTPATIENT)
Dept: FAMILY MEDICINE | Age: 41
End: 2021-05-13

## 2021-05-13 VITALS
WEIGHT: 269.6 LBS | SYSTOLIC BLOOD PRESSURE: 124 MMHG | HEIGHT: 71 IN | DIASTOLIC BLOOD PRESSURE: 78 MMHG | TEMPERATURE: 97.4 F | BODY MASS INDEX: 37.74 KG/M2 | HEART RATE: 76 BPM

## 2021-05-13 DIAGNOSIS — A60.01 HERPES SIMPLEX INFECTION OF PENIS: ICD-10-CM

## 2021-05-13 DIAGNOSIS — E55.9 VITAMIN D DEFICIENCY: ICD-10-CM

## 2021-05-13 DIAGNOSIS — E04.1 THYROID NODULE: ICD-10-CM

## 2021-05-13 DIAGNOSIS — M79.7 FIBROMYALGIA: ICD-10-CM

## 2021-05-13 DIAGNOSIS — J30.89 NON-SEASONAL ALLERGIC RHINITIS DUE TO OTHER ALLERGIC TRIGGER: ICD-10-CM

## 2021-05-13 DIAGNOSIS — F31.0 BIPOLAR AFFECTIVE DISORDER, CURRENT EPISODE HYPOMANIC (CMD): Primary | ICD-10-CM

## 2021-05-13 DIAGNOSIS — G89.4 CHRONIC PAIN SYNDROME: ICD-10-CM

## 2021-05-13 DIAGNOSIS — K21.9 GASTROESOPHAGEAL REFLUX DISEASE, UNSPECIFIED WHETHER ESOPHAGITIS PRESENT: ICD-10-CM

## 2021-05-13 PROCEDURE — 99214 OFFICE O/P EST MOD 30 MIN: CPT | Performed by: FAMILY MEDICINE

## 2021-05-13 RX ORDER — FAMOTIDINE 20 MG/1
20 TABLET, FILM COATED ORAL 2 TIMES DAILY
Qty: 60 TABLET | Refills: 1 | Status: SHIPPED | OUTPATIENT
Start: 2021-05-13 | End: 2021-06-10

## 2021-05-13 RX ORDER — VALACYCLOVIR HYDROCHLORIDE 500 MG/1
500 TABLET, FILM COATED ORAL DAILY
Qty: 90 TABLET | Refills: 1 | Status: SHIPPED
Start: 2021-05-13 | End: 2021-11-29 | Stop reason: SDUPTHER

## 2021-05-26 VITALS
DIASTOLIC BLOOD PRESSURE: 85 MMHG | WEIGHT: 263.45 LBS | TEMPERATURE: 98.9 F | HEIGHT: 71 IN | HEART RATE: 106 BPM | HEART RATE: 100 BPM | BODY MASS INDEX: 36.88 KG/M2 | SYSTOLIC BLOOD PRESSURE: 147 MMHG | WEIGHT: 263.89 LBS | RESPIRATION RATE: 18 BRPM | TEMPERATURE: 98.1 F | OXYGEN SATURATION: 96 % | OXYGEN SATURATION: 94 % | BODY MASS INDEX: 36.94 KG/M2 | RESPIRATION RATE: 18 BRPM | HEIGHT: 71 IN | SYSTOLIC BLOOD PRESSURE: 133 MMHG | DIASTOLIC BLOOD PRESSURE: 88 MMHG

## 2021-05-29 DIAGNOSIS — F31.0 BIPOLAR AFFECTIVE DISORDER, CURRENT EPISODE HYPOMANIC (CMD): ICD-10-CM

## 2021-06-01 RX ORDER — DIVALPROEX SODIUM 250 MG/1
250 TABLET, EXTENDED RELEASE ORAL 2 TIMES DAILY
Qty: 60 TABLET | Refills: 0 | Status: SHIPPED | OUTPATIENT
Start: 2021-06-01 | End: 2021-06-28

## 2021-06-10 DIAGNOSIS — K21.9 GASTROESOPHAGEAL REFLUX DISEASE, UNSPECIFIED WHETHER ESOPHAGITIS PRESENT: ICD-10-CM

## 2021-06-10 RX ORDER — FAMOTIDINE 20 MG/1
20 TABLET, FILM COATED ORAL 2 TIMES DAILY
Qty: 60 TABLET | Refills: 1 | Status: SHIPPED | OUTPATIENT
Start: 2021-06-10 | End: 2021-07-05

## 2021-06-26 DIAGNOSIS — F31.0 BIPOLAR AFFECTIVE DISORDER, CURRENT EPISODE HYPOMANIC (CMD): ICD-10-CM

## 2021-06-28 RX ORDER — DIVALPROEX SODIUM 250 MG/1
250 TABLET, EXTENDED RELEASE ORAL 2 TIMES DAILY
Qty: 60 TABLET | Refills: 0 | Status: SHIPPED | OUTPATIENT
Start: 2021-06-28 | End: 2021-07-21 | Stop reason: SDUPTHER

## 2021-07-03 DIAGNOSIS — K21.9 GASTROESOPHAGEAL REFLUX DISEASE, UNSPECIFIED WHETHER ESOPHAGITIS PRESENT: ICD-10-CM

## 2021-07-05 RX ORDER — FAMOTIDINE 20 MG/1
20 TABLET, FILM COATED ORAL 2 TIMES DAILY
Qty: 60 TABLET | Refills: 5 | Status: SHIPPED | OUTPATIENT
Start: 2021-07-05 | End: 2021-11-29 | Stop reason: SDUPTHER

## 2021-07-16 ENCOUNTER — TELEPHONE (OUTPATIENT)
Dept: PSYCHIATRY | Age: 41
End: 2021-07-16

## 2021-07-16 DIAGNOSIS — F31.0 BIPOLAR AFFECTIVE DISORDER, CURRENT EPISODE HYPOMANIC (CMD): ICD-10-CM

## 2021-07-16 RX ORDER — QUETIAPINE FUMARATE 50 MG/1
TABLET, FILM COATED ORAL
Qty: 60 TABLET | Refills: 0 | Status: SHIPPED | OUTPATIENT
Start: 2021-07-16 | End: 2021-07-21 | Stop reason: DRUGHIGH

## 2021-07-21 ENCOUNTER — V-VISIT (OUTPATIENT)
Dept: PSYCHIATRY | Age: 41
End: 2021-07-21

## 2021-07-21 ENCOUNTER — E-ADVICE (OUTPATIENT)
Dept: PSYCHIATRY | Age: 41
End: 2021-07-21

## 2021-07-21 DIAGNOSIS — F31.0 BIPOLAR AFFECTIVE DISORDER, CURRENT EPISODE HYPOMANIC (CMD): Primary | ICD-10-CM

## 2021-07-21 PROCEDURE — 99214 OFFICE O/P EST MOD 30 MIN: CPT | Performed by: NURSE PRACTITIONER

## 2021-07-21 RX ORDER — DIVALPROEX SODIUM 250 MG/1
250 TABLET, EXTENDED RELEASE ORAL 2 TIMES DAILY
Qty: 60 TABLET | Refills: 2 | Status: SHIPPED | OUTPATIENT
Start: 2021-07-21 | End: 2021-09-21 | Stop reason: SDUPTHER

## 2021-07-21 RX ORDER — QUETIAPINE FUMARATE 50 MG/1
50-100 TABLET, FILM COATED ORAL NIGHTLY PRN
Qty: 60 TABLET | Refills: 2 | Status: SHIPPED | OUTPATIENT
Start: 2021-07-21 | End: 2021-11-09

## 2021-07-29 ENCOUNTER — TELEPHONE (OUTPATIENT)
Dept: FAMILY MEDICINE | Age: 41
End: 2021-07-29

## 2021-07-29 DIAGNOSIS — G89.4 CHRONIC PAIN SYNDROME: ICD-10-CM

## 2021-07-29 DIAGNOSIS — G89.29 CHRONIC MIDLINE LOW BACK PAIN WITH BILATERAL SCIATICA: ICD-10-CM

## 2021-07-29 DIAGNOSIS — M54.42 CHRONIC MIDLINE LOW BACK PAIN WITH BILATERAL SCIATICA: ICD-10-CM

## 2021-07-29 DIAGNOSIS — M79.7 FIBROMYALGIA: ICD-10-CM

## 2021-07-29 DIAGNOSIS — M54.41 CHRONIC MIDLINE LOW BACK PAIN WITH BILATERAL SCIATICA: ICD-10-CM

## 2021-07-29 RX ORDER — PREGABALIN 150 MG/1
150 CAPSULE ORAL 2 TIMES DAILY
Qty: 60 CAPSULE | Refills: 5 | Status: SHIPPED | OUTPATIENT
Start: 2021-07-29 | End: 2021-07-29 | Stop reason: SDUPTHER

## 2021-07-29 RX ORDER — PREGABALIN 150 MG/1
150 CAPSULE ORAL 2 TIMES DAILY
Qty: 60 CAPSULE | Refills: 3 | Status: SHIPPED | OUTPATIENT
Start: 2021-07-29 | End: 2021-11-29 | Stop reason: SDUPTHER

## 2021-08-07 ENCOUNTER — TELEPHONE (OUTPATIENT)
Dept: FAMILY MEDICINE | Age: 41
End: 2021-08-07

## 2021-08-07 DIAGNOSIS — M54.50 LOW BACK PAIN: ICD-10-CM

## 2021-08-09 RX ORDER — METHOCARBAMOL 750 MG/1
TABLET, FILM COATED ORAL
Qty: 90 TABLET | Refills: 3 | Status: SHIPPED | OUTPATIENT
Start: 2021-08-09 | End: 2021-11-26

## 2021-08-11 ENCOUNTER — WALK IN (OUTPATIENT)
Dept: URGENT CARE | Age: 41
End: 2021-08-11

## 2021-08-11 VITALS
HEART RATE: 86 BPM | RESPIRATION RATE: 18 BRPM | DIASTOLIC BLOOD PRESSURE: 89 MMHG | TEMPERATURE: 96.8 F | OXYGEN SATURATION: 96 % | SYSTOLIC BLOOD PRESSURE: 130 MMHG

## 2021-08-11 DIAGNOSIS — R09.81 SINUS CONGESTION: Primary | ICD-10-CM

## 2021-08-11 LAB — SARS-COV+SARS-COV-2 AG RESP QL IA.RAPID: NOT DETECTED

## 2021-08-11 PROCEDURE — 87426 SARSCOV CORONAVIRUS AG IA: CPT | Performed by: FAMILY MEDICINE

## 2021-08-11 PROCEDURE — 99213 OFFICE O/P EST LOW 20 MIN: CPT | Performed by: FAMILY MEDICINE

## 2021-09-08 DIAGNOSIS — F31.0 BIPOLAR AFFECTIVE DISORDER, CURRENT EPISODE HYPOMANIC (CMD): ICD-10-CM

## 2021-09-08 RX ORDER — DIVALPROEX SODIUM 500 MG/1
TABLET, EXTENDED RELEASE ORAL
Qty: 30 TABLET | Refills: 0 | Status: SHIPPED | OUTPATIENT
Start: 2021-09-08 | End: 2021-09-21 | Stop reason: SDUPTHER

## 2021-09-21 ENCOUNTER — V-VISIT (OUTPATIENT)
Dept: PSYCHIATRY | Age: 41
End: 2021-09-21

## 2021-09-21 DIAGNOSIS — F31.0 BIPOLAR AFFECTIVE DISORDER, CURRENT EPISODE HYPOMANIC (CMD): Primary | ICD-10-CM

## 2021-09-21 DIAGNOSIS — J30.89 NON-SEASONAL ALLERGIC RHINITIS DUE TO OTHER ALLERGIC TRIGGER: ICD-10-CM

## 2021-09-21 PROCEDURE — 99214 OFFICE O/P EST MOD 30 MIN: CPT | Performed by: NURSE PRACTITIONER

## 2021-09-21 RX ORDER — DIVALPROEX SODIUM 250 MG/1
250 TABLET, EXTENDED RELEASE ORAL 2 TIMES DAILY
Qty: 60 TABLET | Refills: 3 | Status: SHIPPED | OUTPATIENT
Start: 2021-09-21 | End: 2022-01-26

## 2021-09-21 RX ORDER — VILAZODONE HYDROCHLORIDE 40 MG/1
40 TABLET ORAL DAILY
Qty: 30 TABLET | Refills: 3 | Status: SHIPPED | OUTPATIENT
Start: 2021-09-21 | End: 2022-03-22 | Stop reason: SDUPTHER

## 2021-09-21 RX ORDER — QUETIAPINE FUMARATE 200 MG/1
200-300 TABLET, FILM COATED ORAL NIGHTLY
Qty: 45 TABLET | Refills: 3 | Status: SHIPPED | OUTPATIENT
Start: 2021-09-21 | End: 2021-11-26

## 2021-09-21 RX ORDER — DIVALPROEX SODIUM 500 MG/1
TABLET, EXTENDED RELEASE ORAL
Qty: 30 TABLET | Refills: 3 | Status: SHIPPED | OUTPATIENT
Start: 2021-09-21 | End: 2022-02-15

## 2021-10-20 ENCOUNTER — IMMUNIZATION (OUTPATIENT)
Dept: FAMILY MEDICINE | Age: 41
End: 2021-10-20

## 2021-10-20 DIAGNOSIS — Z23 NEED FOR VACCINATION: Primary | ICD-10-CM

## 2021-10-20 PROCEDURE — G0008 ADMIN INFLUENZA VIRUS VAC: HCPCS | Performed by: FAMILY MEDICINE

## 2021-10-20 PROCEDURE — 90686 IIV4 VACC NO PRSV 0.5 ML IM: CPT | Performed by: FAMILY MEDICINE

## 2021-10-27 DIAGNOSIS — F31.0 BIPOLAR AFFECTIVE DISORDER, CURRENT EPISODE HYPOMANIC (CMD): ICD-10-CM

## 2021-10-27 RX ORDER — ALPRAZOLAM 1 MG/1
TABLET ORAL
Qty: 60 TABLET | Refills: 2 | Status: SHIPPED | OUTPATIENT
Start: 2021-10-27 | End: 2022-05-04

## 2021-11-09 DIAGNOSIS — F31.0 BIPOLAR AFFECTIVE DISORDER, CURRENT EPISODE HYPOMANIC (CMD): ICD-10-CM

## 2021-11-09 RX ORDER — QUETIAPINE FUMARATE 50 MG/1
50-100 TABLET, FILM COATED ORAL NIGHTLY PRN
Qty: 60 TABLET | Refills: 2 | Status: SHIPPED | OUTPATIENT
Start: 2021-11-09 | End: 2022-02-13

## 2021-11-24 DIAGNOSIS — J30.89 NON-SEASONAL ALLERGIC RHINITIS DUE TO OTHER ALLERGIC TRIGGER: ICD-10-CM

## 2021-11-24 DIAGNOSIS — F31.0 BIPOLAR AFFECTIVE DISORDER, CURRENT EPISODE HYPOMANIC (CMD): ICD-10-CM

## 2021-11-24 DIAGNOSIS — M54.50 LOW BACK PAIN: ICD-10-CM

## 2021-11-24 DIAGNOSIS — M54.41 CHRONIC MIDLINE LOW BACK PAIN WITH BILATERAL SCIATICA: Primary | ICD-10-CM

## 2021-11-24 DIAGNOSIS — G89.4 CHRONIC PAIN SYNDROME: ICD-10-CM

## 2021-11-24 DIAGNOSIS — G89.29 CHRONIC MIDLINE LOW BACK PAIN WITH BILATERAL SCIATICA: Primary | ICD-10-CM

## 2021-11-24 DIAGNOSIS — M79.7 FIBROMYALGIA: ICD-10-CM

## 2021-11-24 DIAGNOSIS — M54.42 CHRONIC MIDLINE LOW BACK PAIN WITH BILATERAL SCIATICA: Primary | ICD-10-CM

## 2021-11-26 RX ORDER — QUETIAPINE FUMARATE 200 MG/1
TABLET, FILM COATED ORAL
Qty: 135 TABLET | Refills: 0 | Status: SHIPPED | OUTPATIENT
Start: 2021-11-26 | End: 2022-02-15

## 2021-11-26 RX ORDER — METHOCARBAMOL 750 MG/1
TABLET, FILM COATED ORAL
Qty: 90 TABLET | Refills: 3 | Status: SHIPPED | OUTPATIENT
Start: 2021-11-26 | End: 2021-11-29 | Stop reason: SDUPTHER

## 2021-11-26 RX ORDER — HYDROXYZINE 50 MG/1
50 TABLET, FILM COATED ORAL NIGHTLY PRN
Qty: 90 TABLET | Refills: 1 | Status: SHIPPED | OUTPATIENT
Start: 2021-11-26 | End: 2021-11-29 | Stop reason: SDUPTHER

## 2021-11-28 PROBLEM — E78.5 DYSLIPIDEMIA: Status: ACTIVE | Noted: 2020-12-12

## 2021-11-28 ASSESSMENT — PATIENT HEALTH QUESTIONNAIRE - PHQ9
2. FEELING DOWN, DEPRESSED OR HOPELESS: SEVERAL DAYS
SUM OF ALL RESPONSES TO PHQ9 QUESTIONS 1 AND 2: 2
1. LITTLE INTEREST OR PLEASURE IN DOING THINGS: SEVERAL DAYS
CLINICAL INTERPRETATION OF PHQ2 SCORE: NO FURTHER SCREENING NEEDED
CLINICAL INTERPRETATION OF PHQ2 SCORE: 2

## 2021-11-29 ENCOUNTER — OFFICE VISIT (OUTPATIENT)
Dept: FAMILY MEDICINE | Age: 41
End: 2021-11-29

## 2021-11-29 VITALS
HEART RATE: 88 BPM | HEIGHT: 71 IN | BODY MASS INDEX: 37.8 KG/M2 | DIASTOLIC BLOOD PRESSURE: 70 MMHG | SYSTOLIC BLOOD PRESSURE: 102 MMHG | WEIGHT: 270 LBS | TEMPERATURE: 97.1 F

## 2021-11-29 DIAGNOSIS — Z79.899 HIGH RISK MEDICATION USE: ICD-10-CM

## 2021-11-29 DIAGNOSIS — A60.01 HERPES SIMPLEX INFECTION OF PENIS: ICD-10-CM

## 2021-11-29 DIAGNOSIS — E04.1 THYROID NODULE: ICD-10-CM

## 2021-11-29 DIAGNOSIS — K57.30 DIVERTICULOSIS OF COLON: ICD-10-CM

## 2021-11-29 DIAGNOSIS — M79.7 FIBROMYALGIA: ICD-10-CM

## 2021-11-29 DIAGNOSIS — Z79.01 CURRENT USE OF LONG TERM ANTICOAGULATION: ICD-10-CM

## 2021-11-29 DIAGNOSIS — K21.9 GASTROESOPHAGEAL REFLUX DISEASE, UNSPECIFIED WHETHER ESOPHAGITIS PRESENT: ICD-10-CM

## 2021-11-29 DIAGNOSIS — Z86.711 HISTORY OF PULMONARY EMBOLISM: ICD-10-CM

## 2021-11-29 DIAGNOSIS — E78.5 DYSLIPIDEMIA: ICD-10-CM

## 2021-11-29 DIAGNOSIS — J30.89 NON-SEASONAL ALLERGIC RHINITIS DUE TO OTHER ALLERGIC TRIGGER: ICD-10-CM

## 2021-11-29 DIAGNOSIS — G89.29 CHRONIC MIDLINE LOW BACK PAIN WITH BILATERAL SCIATICA: ICD-10-CM

## 2021-11-29 DIAGNOSIS — K52.9 CHRONIC DIARRHEA: ICD-10-CM

## 2021-11-29 DIAGNOSIS — E66.9 OBESITY (BMI 30-39.9): ICD-10-CM

## 2021-11-29 DIAGNOSIS — Z12.5 SCREENING FOR PROSTATE CANCER: ICD-10-CM

## 2021-11-29 DIAGNOSIS — E55.9 VITAMIN D DEFICIENCY: ICD-10-CM

## 2021-11-29 DIAGNOSIS — F31.0 BIPOLAR AFFECTIVE DISORDER, CURRENT EPISODE HYPOMANIC (CMD): ICD-10-CM

## 2021-11-29 DIAGNOSIS — M54.41 CHRONIC MIDLINE LOW BACK PAIN WITH BILATERAL SCIATICA: ICD-10-CM

## 2021-11-29 DIAGNOSIS — G89.4 CHRONIC PAIN SYNDROME: ICD-10-CM

## 2021-11-29 DIAGNOSIS — M54.42 CHRONIC MIDLINE LOW BACK PAIN WITH BILATERAL SCIATICA: ICD-10-CM

## 2021-11-29 DIAGNOSIS — Z13.1 SCREENING FOR DIABETES MELLITUS: ICD-10-CM

## 2021-11-29 DIAGNOSIS — Z00.00 MEDICARE ANNUAL WELLNESS VISIT, SUBSEQUENT: Primary | ICD-10-CM

## 2021-11-29 PROCEDURE — 99214 OFFICE O/P EST MOD 30 MIN: CPT | Performed by: FAMILY MEDICINE

## 2021-11-29 PROCEDURE — G0439 PPPS, SUBSEQ VISIT: HCPCS | Performed by: FAMILY MEDICINE

## 2021-11-29 RX ORDER — HYDROXYZINE 50 MG/1
50 TABLET, FILM COATED ORAL NIGHTLY PRN
Qty: 90 TABLET | Refills: 1 | Status: SHIPPED | OUTPATIENT
Start: 2021-11-29 | End: 2022-06-22

## 2021-11-29 RX ORDER — VALACYCLOVIR HYDROCHLORIDE 500 MG/1
500 TABLET, FILM COATED ORAL DAILY
Qty: 90 TABLET | Refills: 1 | Status: SHIPPED | OUTPATIENT
Start: 2021-11-29 | End: 2022-03-28

## 2021-11-29 RX ORDER — PANTOPRAZOLE SODIUM 40 MG/1
40 TABLET, DELAYED RELEASE ORAL 2 TIMES DAILY
Qty: 180 TABLET | Refills: 1 | Status: SHIPPED | OUTPATIENT
Start: 2021-11-29 | End: 2022-03-28

## 2021-11-29 RX ORDER — FAMOTIDINE 20 MG/1
20 TABLET, FILM COATED ORAL 2 TIMES DAILY
Qty: 180 TABLET | Refills: 1 | Status: SHIPPED | OUTPATIENT
Start: 2021-11-29 | End: 2022-02-17 | Stop reason: CLARIF

## 2021-11-29 RX ORDER — METHOCARBAMOL 750 MG/1
750 TABLET, FILM COATED ORAL 3 TIMES DAILY
Qty: 90 TABLET | Refills: 5 | Status: SHIPPED | OUTPATIENT
Start: 2021-11-29 | End: 2023-04-17 | Stop reason: SDUPTHER

## 2021-11-29 RX ORDER — PREGABALIN 150 MG/1
150 CAPSULE ORAL 2 TIMES DAILY
Qty: 60 CAPSULE | Refills: 5 | Status: SHIPPED | OUTPATIENT
Start: 2021-11-29 | End: 2022-06-23

## 2021-11-30 ENCOUNTER — LAB SERVICES (OUTPATIENT)
Dept: LAB | Age: 41
End: 2021-11-30

## 2021-11-30 DIAGNOSIS — E55.9 VITAMIN D DEFICIENCY: ICD-10-CM

## 2021-11-30 DIAGNOSIS — Z13.1 SCREENING FOR DIABETES MELLITUS: ICD-10-CM

## 2021-11-30 DIAGNOSIS — E78.5 DYSLIPIDEMIA: ICD-10-CM

## 2021-11-30 DIAGNOSIS — Z12.5 SCREENING FOR PROSTATE CANCER: ICD-10-CM

## 2021-11-30 DIAGNOSIS — E04.1 THYROID NODULE: ICD-10-CM

## 2021-11-30 DIAGNOSIS — K52.9 CHRONIC DIARRHEA: ICD-10-CM

## 2021-11-30 DIAGNOSIS — Z79.899 HIGH RISK MEDICATION USE: ICD-10-CM

## 2021-11-30 LAB
25(OH)D3+25(OH)D2 SERPL-MCNC: 32 NG/ML (ref 30–100)
ALBUMIN SERPL-MCNC: 3.4 G/DL (ref 3.6–5.1)
ALBUMIN/GLOB SERPL: 0.9 {RATIO} (ref 1–2.4)
ALP SERPL-CCNC: 64 UNITS/L (ref 45–117)
ALT SERPL-CCNC: 96 UNITS/L
ANION GAP SERPL CALC-SCNC: 13 MMOL/L (ref 10–20)
AST SERPL-CCNC: 45 UNITS/L
BILIRUB SERPL-MCNC: 0.4 MG/DL (ref 0.2–1)
BUN SERPL-MCNC: 14 MG/DL (ref 6–20)
BUN/CREAT SERPL: 13 (ref 7–25)
CALCIUM SERPL-MCNC: 9 MG/DL (ref 8.4–10.2)
CHLORIDE SERPL-SCNC: 107 MMOL/L (ref 98–107)
CHOLEST SERPL-MCNC: 217 MG/DL
CHOLEST/HDLC SERPL: 6.4 {RATIO}
CO2 SERPL-SCNC: 29 MMOL/L (ref 21–32)
CREAT SERPL-MCNC: 1.09 MG/DL (ref 0.67–1.17)
FASTING DURATION TIME PATIENT: 12 HOURS (ref 0–999)
FASTING DURATION TIME PATIENT: 12 HOURS (ref 0–999)
GFR SERPLBLD BASED ON 1.73 SQ M-ARVRAT: 84 ML/MIN
GLOBULIN SER-MCNC: 3.7 G/DL (ref 2–4)
GLUCOSE SERPL-MCNC: 92 MG/DL (ref 70–99)
HDLC SERPL-MCNC: 34 MG/DL
LDLC SERPL CALC-MCNC: 144 MG/DL
NONHDLC SERPL-MCNC: 183 MG/DL
POTASSIUM SERPL-SCNC: 4.3 MMOL/L (ref 3.4–5.1)
PROT SERPL-MCNC: 7.1 G/DL (ref 6.4–8.2)
PSA SERPL-MCNC: 0.52 NG/ML
SODIUM SERPL-SCNC: 145 MMOL/L (ref 135–145)
T4 FREE SERPL-MCNC: 0.9 NG/DL (ref 0.8–1.5)
TRIGL SERPL-MCNC: 196 MG/DL
TSH SERPL-ACNC: 8.48 MCUNITS/ML (ref 0.35–5)

## 2021-11-30 PROCEDURE — 80053 COMPREHEN METABOLIC PANEL: CPT | Performed by: INTERNAL MEDICINE

## 2021-11-30 PROCEDURE — 36415 COLL VENOUS BLD VENIPUNCTURE: CPT | Performed by: INTERNAL MEDICINE

## 2021-11-30 PROCEDURE — 82306 VITAMIN D 25 HYDROXY: CPT | Performed by: CLINICAL MEDICAL LABORATORY

## 2021-11-30 PROCEDURE — 84443 ASSAY THYROID STIM HORMONE: CPT | Performed by: CLINICAL MEDICAL LABORATORY

## 2021-11-30 PROCEDURE — 84153 ASSAY OF PSA TOTAL: CPT | Performed by: CLINICAL MEDICAL LABORATORY

## 2021-11-30 PROCEDURE — 80061 LIPID PANEL: CPT | Performed by: CLINICAL MEDICAL LABORATORY

## 2021-11-30 PROCEDURE — 84439 ASSAY OF FREE THYROXINE: CPT | Performed by: CLINICAL MEDICAL LABORATORY

## 2021-12-01 ENCOUNTER — TELEPHONE (OUTPATIENT)
Dept: FAMILY MEDICINE | Age: 41
End: 2021-12-01

## 2021-12-01 DIAGNOSIS — E04.2 MULTIPLE THYROID NODULES: ICD-10-CM

## 2021-12-01 DIAGNOSIS — R79.89 ELEVATED TSH: Primary | ICD-10-CM

## 2021-12-01 DIAGNOSIS — R74.8 ELEVATED LIVER ENZYMES: ICD-10-CM

## 2021-12-21 ENCOUNTER — BEHAVIORAL HEALTH (OUTPATIENT)
Dept: PSYCHIATRY | Age: 41
End: 2021-12-21

## 2021-12-21 DIAGNOSIS — F31.0 BIPOLAR AFFECTIVE DISORDER, CURRENT EPISODE HYPOMANIC (CMD): Primary | ICD-10-CM

## 2021-12-21 PROCEDURE — 99214 OFFICE O/P EST MOD 30 MIN: CPT | Performed by: NURSE PRACTITIONER

## 2021-12-27 DIAGNOSIS — F31.0 BIPOLAR AFFECTIVE DISORDER, CURRENT EPISODE HYPOMANIC (CMD): ICD-10-CM

## 2021-12-27 RX ORDER — QUETIAPINE FUMARATE 200 MG/1
TABLET, FILM COATED ORAL
Qty: 135 TABLET | Refills: 0 | OUTPATIENT
Start: 2021-12-27

## 2021-12-27 RX ORDER — DIVALPROEX SODIUM 250 MG/1
TABLET, EXTENDED RELEASE ORAL
Qty: 180 TABLET | Refills: 1 | OUTPATIENT
Start: 2021-12-27

## 2021-12-29 ENCOUNTER — LAB SERVICES (OUTPATIENT)
Dept: LAB | Age: 41
End: 2021-12-29

## 2021-12-29 DIAGNOSIS — E04.2 MULTIPLE THYROID NODULES: ICD-10-CM

## 2021-12-29 DIAGNOSIS — F31.0 BIPOLAR AFFECTIVE DISORDER, CURRENT EPISODE HYPOMANIC (CMD): ICD-10-CM

## 2021-12-29 DIAGNOSIS — R74.8 ELEVATED LIVER ENZYMES: ICD-10-CM

## 2021-12-29 DIAGNOSIS — R79.89 ELEVATED TSH: ICD-10-CM

## 2021-12-29 LAB
ALBUMIN SERPL-MCNC: 3.3 G/DL (ref 3.6–5.1)
ALP SERPL-CCNC: 62 UNITS/L (ref 45–117)
ALT SERPL-CCNC: 77 UNITS/L
AST SERPL-CCNC: 35 UNITS/L
BILIRUB CONJ SERPL-MCNC: 0.1 MG/DL (ref 0–0.2)
BILIRUB SERPL-MCNC: 0.3 MG/DL (ref 0.2–1)
PROT SERPL-MCNC: 6.9 G/DL (ref 6.4–8.2)
VALPROATE SERPL-MCNC: 54 MCG/ML (ref 50–125)

## 2021-12-29 PROCEDURE — 84443 ASSAY THYROID STIM HORMONE: CPT | Performed by: CLINICAL MEDICAL LABORATORY

## 2021-12-29 PROCEDURE — 80076 HEPATIC FUNCTION PANEL: CPT | Performed by: INTERNAL MEDICINE

## 2021-12-29 PROCEDURE — 84439 ASSAY OF FREE THYROXINE: CPT | Performed by: CLINICAL MEDICAL LABORATORY

## 2021-12-29 PROCEDURE — 80164 ASSAY DIPROPYLACETIC ACD TOT: CPT | Performed by: CLINICAL MEDICAL LABORATORY

## 2021-12-29 PROCEDURE — 99000 SPECIMEN HANDLING OFFICE-LAB: CPT | Performed by: INTERNAL MEDICINE

## 2021-12-30 LAB
T4 FREE SERPL-MCNC: 0.9 NG/DL (ref 0.8–1.5)
TSH SERPL-ACNC: 7.35 MCUNITS/ML (ref 0.35–5)

## 2022-01-04 ENCOUNTER — TELEPHONE (OUTPATIENT)
Dept: FAMILY MEDICINE | Age: 42
End: 2022-01-04

## 2022-01-04 DIAGNOSIS — R79.89 ELEVATED TSH: Primary | ICD-10-CM

## 2022-01-10 ENCOUNTER — IMMUNIZATION (OUTPATIENT)
Dept: FAMILY MEDICINE | Age: 42
End: 2022-01-10

## 2022-01-10 DIAGNOSIS — Z23 NEED FOR VACCINATION: Primary | ICD-10-CM

## 2022-01-10 PROCEDURE — 91306 COVID-19 MODERNA 0.25ML BOOSTER VACCINE: CPT | Performed by: INTERNAL MEDICINE

## 2022-01-10 PROCEDURE — 0064A COVID-19 MODERNA 0.25ML BOOSTER VACCINE: CPT | Performed by: INTERNAL MEDICINE

## 2022-01-12 ENCOUNTER — NURSE TRIAGE (OUTPATIENT)
Dept: FAMILY MEDICINE | Age: 42
End: 2022-01-12

## 2022-01-23 DIAGNOSIS — F31.0 BIPOLAR AFFECTIVE DISORDER, CURRENT EPISODE HYPOMANIC (CMD): ICD-10-CM

## 2022-01-24 RX ORDER — DIVALPROEX SODIUM 250 MG/1
TABLET, EXTENDED RELEASE ORAL
Qty: 180 TABLET | Refills: 1 | OUTPATIENT
Start: 2022-01-24

## 2022-01-24 RX ORDER — QUETIAPINE FUMARATE 200 MG/1
TABLET, FILM COATED ORAL
Qty: 135 TABLET | Refills: 0 | OUTPATIENT
Start: 2022-01-24

## 2022-01-24 RX ORDER — DIVALPROEX SODIUM 500 MG/1
TABLET, EXTENDED RELEASE ORAL
Qty: 30 TABLET | Refills: 3 | OUTPATIENT
Start: 2022-01-24

## 2022-01-24 RX ORDER — RIVAROXABAN 20 MG/1
TABLET, FILM COATED ORAL
Qty: 90 TABLET | Refills: 1 | Status: SHIPPED | OUTPATIENT
Start: 2022-01-24 | End: 2022-06-22

## 2022-01-26 DIAGNOSIS — F31.0 BIPOLAR AFFECTIVE DISORDER, CURRENT EPISODE HYPOMANIC (CMD): ICD-10-CM

## 2022-01-26 RX ORDER — QUETIAPINE FUMARATE 200 MG/1
TABLET, FILM COATED ORAL
Qty: 135 TABLET | Refills: 0 | OUTPATIENT
Start: 2022-01-26

## 2022-01-26 RX ORDER — DIVALPROEX SODIUM 250 MG/1
TABLET, EXTENDED RELEASE ORAL
Qty: 180 TABLET | Refills: 0 | Status: SHIPPED | OUTPATIENT
Start: 2022-01-26 | End: 2022-03-22 | Stop reason: DRUGHIGH

## 2022-02-01 ENCOUNTER — LAB SERVICES (OUTPATIENT)
Dept: LAB | Age: 42
End: 2022-02-01

## 2022-02-01 DIAGNOSIS — R79.89 ELEVATED TSH: ICD-10-CM

## 2022-02-01 LAB
T4 FREE SERPL-MCNC: 0.9 NG/DL (ref 0.8–1.5)
TSH SERPL-ACNC: 8.69 MCUNITS/ML (ref 0.35–5)

## 2022-02-01 PROCEDURE — 84443 ASSAY THYROID STIM HORMONE: CPT | Performed by: CLINICAL MEDICAL LABORATORY

## 2022-02-01 PROCEDURE — 84439 ASSAY OF FREE THYROXINE: CPT | Performed by: CLINICAL MEDICAL LABORATORY

## 2022-02-01 PROCEDURE — 99000 SPECIMEN HANDLING OFFICE-LAB: CPT | Performed by: INTERNAL MEDICINE

## 2022-02-02 ENCOUNTER — TELEPHONE (OUTPATIENT)
Dept: FAMILY MEDICINE | Age: 42
End: 2022-02-02

## 2022-02-02 DIAGNOSIS — R79.89 ELEVATED TSH: Primary | ICD-10-CM

## 2022-02-02 RX ORDER — LEVOTHYROXINE SODIUM 0.03 MG/1
25 TABLET ORAL DAILY
Qty: 90 TABLET | Refills: 0 | Status: SHIPPED | OUTPATIENT
Start: 2022-02-02 | End: 2022-02-24

## 2022-02-12 DIAGNOSIS — F31.0 BIPOLAR AFFECTIVE DISORDER, CURRENT EPISODE HYPOMANIC (CMD): ICD-10-CM

## 2022-02-13 RX ORDER — QUETIAPINE FUMARATE 50 MG/1
50-100 TABLET, FILM COATED ORAL NIGHTLY PRN
Qty: 60 TABLET | Refills: 1 | Status: SHIPPED | OUTPATIENT
Start: 2022-02-13 | End: 2022-03-22 | Stop reason: SDUPTHER

## 2022-02-14 ENCOUNTER — TELEPHONE (OUTPATIENT)
Dept: FAMILY MEDICINE | Age: 42
End: 2022-02-14

## 2022-02-15 DIAGNOSIS — F31.0 BIPOLAR AFFECTIVE DISORDER, CURRENT EPISODE HYPOMANIC (CMD): ICD-10-CM

## 2022-02-15 RX ORDER — DIVALPROEX SODIUM 250 MG/1
TABLET, EXTENDED RELEASE ORAL
Qty: 180 TABLET | Refills: 0 | OUTPATIENT
Start: 2022-02-15

## 2022-02-15 RX ORDER — QUETIAPINE FUMARATE 200 MG/1
TABLET, FILM COATED ORAL
Qty: 135 TABLET | Refills: 0 | Status: SHIPPED | OUTPATIENT
Start: 2022-02-15 | End: 2022-03-22 | Stop reason: SDUPTHER

## 2022-02-15 RX ORDER — DIVALPROEX SODIUM 500 MG/1
TABLET, EXTENDED RELEASE ORAL
Qty: 30 TABLET | Refills: 0 | Status: SHIPPED | OUTPATIENT
Start: 2022-02-15 | End: 2022-03-22 | Stop reason: SDUPTHER

## 2022-02-16 ENCOUNTER — TELEPHONE (OUTPATIENT)
Dept: FAMILY MEDICINE | Age: 42
End: 2022-02-16

## 2022-02-17 ENCOUNTER — OFFICE VISIT (OUTPATIENT)
Dept: FAMILY MEDICINE | Age: 42
End: 2022-02-17

## 2022-02-17 ENCOUNTER — TELEPHONE (OUTPATIENT)
Dept: FAMILY MEDICINE | Age: 42
End: 2022-02-17

## 2022-02-17 VITALS
TEMPERATURE: 97.3 F | DIASTOLIC BLOOD PRESSURE: 78 MMHG | WEIGHT: 266.3 LBS | SYSTOLIC BLOOD PRESSURE: 128 MMHG | HEART RATE: 72 BPM | BODY MASS INDEX: 37.28 KG/M2 | HEIGHT: 71 IN

## 2022-02-17 DIAGNOSIS — M54.41 CHRONIC MIDLINE LOW BACK PAIN WITH BILATERAL SCIATICA: Primary | ICD-10-CM

## 2022-02-17 DIAGNOSIS — M79.7 FIBROMYALGIA: ICD-10-CM

## 2022-02-17 DIAGNOSIS — E03.8 SUBCLINICAL HYPOTHYROIDISM: ICD-10-CM

## 2022-02-17 DIAGNOSIS — G89.29 CHRONIC MIDLINE LOW BACK PAIN WITH BILATERAL SCIATICA: Primary | ICD-10-CM

## 2022-02-17 DIAGNOSIS — M54.42 CHRONIC MIDLINE LOW BACK PAIN WITH BILATERAL SCIATICA: Primary | ICD-10-CM

## 2022-02-17 DIAGNOSIS — G89.4 CHRONIC PAIN SYNDROME: ICD-10-CM

## 2022-02-17 PROCEDURE — 99213 OFFICE O/P EST LOW 20 MIN: CPT | Performed by: FAMILY MEDICINE

## 2022-02-17 ASSESSMENT — PATIENT HEALTH QUESTIONNAIRE - PHQ9
SUM OF ALL RESPONSES TO PHQ9 QUESTIONS 1 AND 2: 0
SUM OF ALL RESPONSES TO PHQ9 QUESTIONS 1 AND 2: 0
CLINICAL INTERPRETATION OF PHQ2 SCORE: NO FURTHER SCREENING NEEDED
2. FEELING DOWN, DEPRESSED OR HOPELESS: NOT AT ALL
1. LITTLE INTEREST OR PLEASURE IN DOING THINGS: NOT AT ALL

## 2022-02-23 ENCOUNTER — MED INFO FORMS (OUTPATIENT)
Dept: HEALTH INFORMATION MANAGEMENT | Age: 42
End: 2022-02-23

## 2022-02-24 DIAGNOSIS — R79.89 ELEVATED TSH: ICD-10-CM

## 2022-02-24 DIAGNOSIS — F31.0 BIPOLAR AFFECTIVE DISORDER, CURRENT EPISODE HYPOMANIC (CMD): ICD-10-CM

## 2022-02-24 RX ORDER — DIVALPROEX SODIUM 500 MG/1
TABLET, EXTENDED RELEASE ORAL
Qty: 30 TABLET | Refills: 0 | OUTPATIENT
Start: 2022-02-24

## 2022-02-24 RX ORDER — LEVOTHYROXINE SODIUM 0.03 MG/1
TABLET ORAL
Qty: 90 TABLET | Refills: 1 | Status: SHIPPED | OUTPATIENT
Start: 2022-02-24 | End: 2022-06-27 | Stop reason: SDUPTHER

## 2022-03-09 DIAGNOSIS — F31.0 BIPOLAR AFFECTIVE DISORDER, CURRENT EPISODE HYPOMANIC (CMD): ICD-10-CM

## 2022-03-09 RX ORDER — QUETIAPINE FUMARATE 50 MG/1
50-100 TABLET, FILM COATED ORAL NIGHTLY PRN
Qty: 60 TABLET | Refills: 1 | OUTPATIENT
Start: 2022-03-09

## 2022-03-17 ENCOUNTER — NURSE TRIAGE (OUTPATIENT)
Dept: FAMILY MEDICINE | Age: 42
End: 2022-03-17

## 2022-03-18 ENCOUNTER — LAB SERVICES (OUTPATIENT)
Dept: LAB | Age: 42
End: 2022-03-18

## 2022-03-18 ENCOUNTER — TELEPHONE (OUTPATIENT)
Dept: FAMILY MEDICINE | Age: 42
End: 2022-03-18

## 2022-03-18 ENCOUNTER — OFFICE VISIT (OUTPATIENT)
Dept: FAMILY MEDICINE | Age: 42
End: 2022-03-18

## 2022-03-18 VITALS
SYSTOLIC BLOOD PRESSURE: 124 MMHG | WEIGHT: 267.8 LBS | HEART RATE: 89 BPM | OXYGEN SATURATION: 98 % | HEIGHT: 71 IN | TEMPERATURE: 96.4 F | BODY MASS INDEX: 37.49 KG/M2 | DIASTOLIC BLOOD PRESSURE: 80 MMHG

## 2022-03-18 DIAGNOSIS — R10.11 RUQ ABDOMINAL PAIN: ICD-10-CM

## 2022-03-18 DIAGNOSIS — R10.11 RUQ ABDOMINAL PAIN: Primary | ICD-10-CM

## 2022-03-18 LAB
ALBUMIN SERPL-MCNC: 3.5 G/DL (ref 3.6–5.1)
ALBUMIN/GLOB SERPL: 0.9 {RATIO} (ref 1–2.4)
ALP SERPL-CCNC: 60 UNITS/L (ref 45–117)
ALT SERPL-CCNC: 63 UNITS/L
AMYLASE SERPL-CCNC: 45 UNITS/L (ref 25–115)
ANION GAP SERPL CALC-SCNC: 15 MMOL/L (ref 10–20)
AST SERPL-CCNC: 33 UNITS/L
BASOPHILS # BLD: 0.1 K/MCL (ref 0–0.3)
BASOPHILS NFR BLD: 1 %
BILIRUB SERPL-MCNC: 0.4 MG/DL (ref 0.2–1)
BUN SERPL-MCNC: 18 MG/DL (ref 6–20)
BUN/CREAT SERPL: 17 (ref 7–25)
CALCIUM SERPL-MCNC: 9 MG/DL (ref 8.4–10.2)
CHLORIDE SERPL-SCNC: 107 MMOL/L (ref 98–107)
CO2 SERPL-SCNC: 28 MMOL/L (ref 21–32)
CREAT SERPL-MCNC: 1.03 MG/DL (ref 0.67–1.17)
DEPRECATED RDW RBC: 47.4 FL (ref 39–50)
EOSINOPHIL # BLD: 0.2 K/MCL (ref 0–0.5)
EOSINOPHIL NFR BLD: 3 %
ERYTHROCYTE [DISTWIDTH] IN BLOOD: 14.6 % (ref 11–15)
FASTING DURATION TIME PATIENT: 10 HOURS (ref 0–999)
GFR SERPLBLD BASED ON 1.73 SQ M-ARVRAT: 90 ML/MIN
GLOBULIN SER-MCNC: 3.8 G/DL (ref 2–4)
GLUCOSE SERPL-MCNC: 88 MG/DL (ref 70–99)
HCT VFR BLD CALC: 47.6 % (ref 39–51)
HGB BLD-MCNC: 15.4 G/DL (ref 13–17)
IMM GRANULOCYTES # BLD AUTO: 0 K/MCL (ref 0–0.2)
IMM GRANULOCYTES # BLD: 1 %
LIPASE SERPL-CCNC: 89 UNITS/L (ref 73–393)
LYMPHOCYTES # BLD: 2.9 K/MCL (ref 1–4.8)
LYMPHOCYTES NFR BLD: 43 %
MCH RBC QN AUTO: 28.3 PG (ref 26–34)
MCHC RBC AUTO-ENTMCNC: 32.4 G/DL (ref 32–36.5)
MCV RBC AUTO: 87.3 FL (ref 78–100)
MONOCYTES # BLD: 0.7 K/MCL (ref 0.3–0.9)
MONOCYTES NFR BLD: 11 %
NEUTROPHILS # BLD: 2.7 K/MCL (ref 1.8–7.7)
NEUTROPHILS NFR BLD: 41 %
PLATELET # BLD AUTO: 237 K/MCL (ref 140–450)
POTASSIUM SERPL-SCNC: 5 MMOL/L (ref 3.4–5.1)
PROT SERPL-MCNC: 7.3 G/DL (ref 6.4–8.2)
RBC # BLD: 5.45 MIL/MCL (ref 4.5–5.9)
SODIUM SERPL-SCNC: 145 MMOL/L (ref 135–145)
WBC # BLD: 6.6 K/MCL (ref 4.2–11)

## 2022-03-18 PROCEDURE — 85025 COMPLETE CBC W/AUTO DIFF WBC: CPT | Performed by: INTERNAL MEDICINE

## 2022-03-18 PROCEDURE — 83690 ASSAY OF LIPASE: CPT | Performed by: INTERNAL MEDICINE

## 2022-03-18 PROCEDURE — 36415 COLL VENOUS BLD VENIPUNCTURE: CPT | Performed by: INTERNAL MEDICINE

## 2022-03-18 PROCEDURE — 99213 OFFICE O/P EST LOW 20 MIN: CPT | Performed by: FAMILY MEDICINE

## 2022-03-18 PROCEDURE — 82150 ASSAY OF AMYLASE: CPT | Performed by: INTERNAL MEDICINE

## 2022-03-18 PROCEDURE — 80053 COMPREHEN METABOLIC PANEL: CPT | Performed by: INTERNAL MEDICINE

## 2022-03-18 ASSESSMENT — PATIENT HEALTH QUESTIONNAIRE - PHQ9
2. FEELING DOWN, DEPRESSED OR HOPELESS: NOT AT ALL
SUM OF ALL RESPONSES TO PHQ9 QUESTIONS 1 AND 2: 0
SUM OF ALL RESPONSES TO PHQ9 QUESTIONS 1 AND 2: 0
CLINICAL INTERPRETATION OF PHQ2 SCORE: NO FURTHER SCREENING NEEDED
1. LITTLE INTEREST OR PLEASURE IN DOING THINGS: NOT AT ALL

## 2022-03-22 ENCOUNTER — BEHAVIORAL HEALTH (OUTPATIENT)
Dept: PSYCHIATRY | Age: 42
End: 2022-03-22

## 2022-03-22 DIAGNOSIS — F31.31 BIPOLAR AFFECTIVE DISORDER, CURRENTLY DEPRESSED, MILD (CMD): Primary | ICD-10-CM

## 2022-03-22 DIAGNOSIS — F31.0 BIPOLAR AFFECTIVE DISORDER, CURRENT EPISODE HYPOMANIC (CMD): ICD-10-CM

## 2022-03-22 PROCEDURE — 99214 OFFICE O/P EST MOD 30 MIN: CPT | Performed by: NURSE PRACTITIONER

## 2022-03-22 RX ORDER — VILAZODONE HYDROCHLORIDE 40 MG/1
40 TABLET ORAL DAILY
Qty: 30 TABLET | Refills: 5 | Status: SHIPPED | OUTPATIENT
Start: 2022-03-22 | End: 2022-08-16 | Stop reason: SDUPTHER

## 2022-03-22 RX ORDER — QUETIAPINE FUMARATE 50 MG/1
50-100 TABLET, FILM COATED ORAL NIGHTLY PRN
Qty: 90 TABLET | Refills: 1 | Status: SHIPPED | OUTPATIENT
Start: 2022-03-22 | End: 2022-07-26

## 2022-03-22 RX ORDER — QUETIAPINE FUMARATE 200 MG/1
200-300 TABLET, FILM COATED ORAL NIGHTLY
Qty: 135 TABLET | Refills: 1 | Status: SHIPPED | OUTPATIENT
Start: 2022-03-22 | End: 2022-08-16 | Stop reason: SDUPTHER

## 2022-03-22 RX ORDER — DIVALPROEX SODIUM 500 MG/1
1000 TABLET, EXTENDED RELEASE ORAL AT BEDTIME
Qty: 180 TABLET | Refills: 1 | Status: SHIPPED | OUTPATIENT
Start: 2022-03-22 | End: 2022-08-16 | Stop reason: SDUPTHER

## 2022-03-27 DIAGNOSIS — F31.0 BIPOLAR AFFECTIVE DISORDER, CURRENT EPISODE HYPOMANIC (CMD): ICD-10-CM

## 2022-03-27 DIAGNOSIS — A60.01 HERPES SIMPLEX INFECTION OF PENIS: ICD-10-CM

## 2022-03-27 DIAGNOSIS — K21.9 GASTROESOPHAGEAL REFLUX DISEASE, UNSPECIFIED WHETHER ESOPHAGITIS PRESENT: ICD-10-CM

## 2022-03-27 RX ORDER — DIVALPROEX SODIUM 250 MG/1
TABLET, EXTENDED RELEASE ORAL
Qty: 180 TABLET | Refills: 0 | OUTPATIENT
Start: 2022-03-27

## 2022-03-27 RX ORDER — DIVALPROEX SODIUM 500 MG/1
TABLET, EXTENDED RELEASE ORAL
Qty: 30 TABLET | OUTPATIENT
Start: 2022-03-27

## 2022-03-28 RX ORDER — VALACYCLOVIR HYDROCHLORIDE 500 MG/1
TABLET, FILM COATED ORAL
Qty: 90 TABLET | Refills: 1 | Status: SHIPPED | OUTPATIENT
Start: 2022-03-28 | End: 2022-09-22 | Stop reason: SDUPTHER

## 2022-03-28 RX ORDER — PANTOPRAZOLE SODIUM 40 MG/1
TABLET, DELAYED RELEASE ORAL
Qty: 180 TABLET | Refills: 1 | Status: SHIPPED | OUTPATIENT
Start: 2022-03-28 | End: 2022-09-22 | Stop reason: SDUPTHER

## 2022-03-30 ENCOUNTER — LAB SERVICES (OUTPATIENT)
Dept: LAB | Age: 42
End: 2022-03-30

## 2022-03-30 DIAGNOSIS — R79.89 ELEVATED TSH: ICD-10-CM

## 2022-03-30 LAB
T4 FREE SERPL-MCNC: 0.9 NG/DL (ref 0.8–1.5)
TSH SERPL-ACNC: 6.57 MCUNITS/ML (ref 0.35–5)

## 2022-03-30 PROCEDURE — 84439 ASSAY OF FREE THYROXINE: CPT | Performed by: CLINICAL MEDICAL LABORATORY

## 2022-03-30 PROCEDURE — 84443 ASSAY THYROID STIM HORMONE: CPT | Performed by: CLINICAL MEDICAL LABORATORY

## 2022-03-30 PROCEDURE — 36415 COLL VENOUS BLD VENIPUNCTURE: CPT | Performed by: INTERNAL MEDICINE

## 2022-03-31 ENCOUNTER — TELEPHONE (OUTPATIENT)
Dept: FAMILY MEDICINE | Age: 42
End: 2022-03-31

## 2022-03-31 DIAGNOSIS — E03.8 SUBCLINICAL HYPOTHYROIDISM: Primary | ICD-10-CM

## 2022-03-31 RX ORDER — THYROID 15 MG/1
15 TABLET ORAL DAILY
Qty: 30 TABLET | Refills: 1 | Status: SHIPPED | OUTPATIENT
Start: 2022-03-31 | End: 2022-05-26

## 2022-04-01 DIAGNOSIS — F31.0 BIPOLAR AFFECTIVE DISORDER, CURRENT EPISODE HYPOMANIC (CMD): ICD-10-CM

## 2022-04-01 RX ORDER — DIVALPROEX SODIUM 250 MG/1
TABLET, EXTENDED RELEASE ORAL
Qty: 180 TABLET | Refills: 0 | OUTPATIENT
Start: 2022-04-01

## 2022-04-01 RX ORDER — QUETIAPINE FUMARATE 50 MG/1
50-100 TABLET, FILM COATED ORAL NIGHTLY PRN
Qty: 180 TABLET | OUTPATIENT
Start: 2022-04-01

## 2022-04-04 ENCOUNTER — TELEPHONE (OUTPATIENT)
Dept: FAMILY MEDICINE | Age: 42
End: 2022-04-04

## 2022-04-20 ENCOUNTER — HOSPITAL ENCOUNTER (OUTPATIENT)
Dept: ULTRASOUND IMAGING | Age: 42
Discharge: HOME | End: 2022-04-20
Attending: FAMILY MEDICINE

## 2022-04-20 DIAGNOSIS — R10.11 RUQ ABDOMINAL PAIN: ICD-10-CM

## 2022-04-20 PROCEDURE — 76705 ECHO EXAM OF ABDOMEN: CPT | Performed by: RADIOLOGY

## 2022-04-20 PROCEDURE — 76705 ECHO EXAM OF ABDOMEN: CPT

## 2022-04-21 ENCOUNTER — TELEPHONE (OUTPATIENT)
Dept: FAMILY MEDICINE | Age: 42
End: 2022-04-21

## 2022-04-21 DIAGNOSIS — R10.11 RUQ ABDOMINAL PAIN: Primary | ICD-10-CM

## 2022-04-21 DIAGNOSIS — K52.9 CHRONIC DIARRHEA: ICD-10-CM

## 2022-04-21 DIAGNOSIS — R10.11 POSTPRANDIAL RUQ PAIN: ICD-10-CM

## 2022-04-24 DIAGNOSIS — F31.0 BIPOLAR AFFECTIVE DISORDER, CURRENT EPISODE HYPOMANIC (CMD): ICD-10-CM

## 2022-04-25 RX ORDER — DIVALPROEX SODIUM 250 MG/1
TABLET, EXTENDED RELEASE ORAL
Qty: 180 TABLET | Refills: 0 | OUTPATIENT
Start: 2022-04-25

## 2022-04-25 RX ORDER — QUETIAPINE FUMARATE 50 MG/1
50-100 TABLET, FILM COATED ORAL NIGHTLY PRN
Qty: 180 TABLET | OUTPATIENT
Start: 2022-04-25

## 2022-05-04 ENCOUNTER — HOSPITAL ENCOUNTER (OUTPATIENT)
Dept: CT IMAGING | Age: 42
Discharge: HOME | End: 2022-05-04
Attending: FAMILY MEDICINE

## 2022-05-04 DIAGNOSIS — K52.9 CHRONIC DIARRHEA: ICD-10-CM

## 2022-05-04 DIAGNOSIS — R10.11 RUQ ABDOMINAL PAIN: ICD-10-CM

## 2022-05-04 DIAGNOSIS — R10.11 POSTPRANDIAL RUQ PAIN: ICD-10-CM

## 2022-05-04 DIAGNOSIS — F31.0 BIPOLAR AFFECTIVE DISORDER, CURRENT EPISODE HYPOMANIC (CMD): ICD-10-CM

## 2022-05-04 PROCEDURE — G1004 CDSM NDSC: HCPCS | Performed by: RADIOLOGY

## 2022-05-04 PROCEDURE — 74177 CT ABD & PELVIS W/CONTRAST: CPT | Performed by: RADIOLOGY

## 2022-05-04 PROCEDURE — 74177 CT ABD & PELVIS W/CONTRAST: CPT

## 2022-05-04 PROCEDURE — 10002807 HB RX 258: Performed by: RADIOLOGY

## 2022-05-04 PROCEDURE — 10002805 HB CONTRAST AGENT: Performed by: RADIOLOGY

## 2022-05-04 PROCEDURE — G1004 CDSM NDSC: HCPCS

## 2022-05-04 RX ORDER — DIVALPROEX SODIUM 250 MG/1
TABLET, EXTENDED RELEASE ORAL
Qty: 180 TABLET | Refills: 0 | OUTPATIENT
Start: 2022-05-04

## 2022-05-04 RX ORDER — QUETIAPINE FUMARATE 50 MG/1
50-100 TABLET, FILM COATED ORAL NIGHTLY PRN
Qty: 180 TABLET | OUTPATIENT
Start: 2022-05-04

## 2022-05-04 RX ORDER — ALPRAZOLAM 1 MG/1
1 TABLET ORAL 2 TIMES DAILY PRN
Qty: 60 TABLET | Refills: 5 | Status: SHIPPED | OUTPATIENT
Start: 2022-05-04 | End: 2022-08-16 | Stop reason: SDUPTHER

## 2022-05-04 RX ADMIN — SODIUM CHLORIDE 100 ML: 9 INJECTION, SOLUTION INTRAVENOUS at 08:01

## 2022-05-04 RX ADMIN — IOHEXOL 100 ML: 300 INJECTION, SOLUTION INTRAVENOUS at 08:00

## 2022-05-05 ENCOUNTER — TELEPHONE (OUTPATIENT)
Dept: FAMILY MEDICINE | Age: 42
End: 2022-05-05

## 2022-05-05 DIAGNOSIS — R10.11 RUQ ABDOMINAL PAIN: Primary | ICD-10-CM

## 2022-05-14 ENCOUNTER — APPOINTMENT (OUTPATIENT)
Dept: CT IMAGING | Age: 42
End: 2022-05-14
Attending: EMERGENCY MEDICINE

## 2022-05-14 ENCOUNTER — HOSPITAL ENCOUNTER (EMERGENCY)
Age: 42
Discharge: HOME | End: 2022-05-14
Attending: EMERGENCY MEDICINE

## 2022-05-14 VITALS
HEIGHT: 71 IN | WEIGHT: 270.95 LBS | TEMPERATURE: 97.3 F | SYSTOLIC BLOOD PRESSURE: 144 MMHG | HEART RATE: 89 BPM | DIASTOLIC BLOOD PRESSURE: 85 MMHG | BODY MASS INDEX: 37.93 KG/M2 | RESPIRATION RATE: 18 BRPM | OXYGEN SATURATION: 96 %

## 2022-05-14 DIAGNOSIS — S06.0X0A CONCUSSION WITHOUT LOSS OF CONSCIOUSNESS, INITIAL ENCOUNTER: Primary | ICD-10-CM

## 2022-05-14 LAB
ANION GAP SERPL CALC-SCNC: 13 MMOL/L (ref 10–20)
APTT PPP: 29 SEC (ref 22–30)
BASOPHILS # BLD: 0.1 K/MCL (ref 0–0.3)
BASOPHILS NFR BLD: 1 %
BUN SERPL-MCNC: 20 MG/DL (ref 6–20)
BUN/CREAT SERPL: 19 (ref 7–25)
CALCIUM SERPL-MCNC: 8.9 MG/DL (ref 8.4–10.2)
CHLORIDE SERPL-SCNC: 109 MMOL/L (ref 98–107)
CO2 SERPL-SCNC: 27 MMOL/L (ref 21–32)
CREAT SERPL-MCNC: 1.08 MG/DL (ref 0.67–1.17)
DEPRECATED RDW RBC: 46.9 FL (ref 39–50)
EOSINOPHIL # BLD: 0.2 K/MCL (ref 0–0.5)
EOSINOPHIL NFR BLD: 2 %
ERYTHROCYTE [DISTWIDTH] IN BLOOD: 14.6 % (ref 11–15)
FASTING DURATION TIME PATIENT: ABNORMAL H
GFR SERPLBLD BASED ON 1.73 SQ M-ARVRAT: 88 ML/MIN
GLUCOSE SERPL-MCNC: 133 MG/DL (ref 70–99)
HCT VFR BLD CALC: 44.7 % (ref 39–51)
HGB BLD-MCNC: 14.7 G/DL (ref 13–17)
IMM GRANULOCYTES # BLD AUTO: 0 K/MCL (ref 0–0.2)
IMM GRANULOCYTES # BLD: 0 %
INR PPP: 1.1
LYMPHOCYTES # BLD: 3 K/MCL (ref 1–4.8)
LYMPHOCYTES NFR BLD: 37 %
MCH RBC QN AUTO: 28.9 PG (ref 26–34)
MCHC RBC AUTO-ENTMCNC: 32.9 G/DL (ref 32–36.5)
MCV RBC AUTO: 87.8 FL (ref 78–100)
MONOCYTES # BLD: 0.8 K/MCL (ref 0.3–0.9)
MONOCYTES NFR BLD: 10 %
NEUTROPHILS # BLD: 4 K/MCL (ref 1.8–7.7)
NEUTROPHILS NFR BLD: 50 %
NRBC BLD MANUAL-RTO: 0 /100 WBC
PLATELET # BLD AUTO: 249 K/MCL (ref 140–450)
POTASSIUM SERPL-SCNC: 4.5 MMOL/L (ref 3.4–5.1)
PROTHROMBIN TIME: 12.1 SEC (ref 9.7–11.8)
RBC # BLD: 5.09 MIL/MCL (ref 4.5–5.9)
SODIUM SERPL-SCNC: 144 MMOL/L (ref 135–145)
WBC # BLD: 8 K/MCL (ref 4.2–11)

## 2022-05-14 PROCEDURE — 99284 EMERGENCY DEPT VISIT MOD MDM: CPT | Performed by: EMERGENCY MEDICINE

## 2022-05-14 PROCEDURE — 85025 COMPLETE CBC W/AUTO DIFF WBC: CPT | Performed by: EMERGENCY MEDICINE

## 2022-05-14 PROCEDURE — 85730 THROMBOPLASTIN TIME PARTIAL: CPT | Performed by: EMERGENCY MEDICINE

## 2022-05-14 PROCEDURE — 80048 BASIC METABOLIC PNL TOTAL CA: CPT | Performed by: EMERGENCY MEDICINE

## 2022-05-14 PROCEDURE — 85610 PROTHROMBIN TIME: CPT | Performed by: EMERGENCY MEDICINE

## 2022-05-14 PROCEDURE — 72125 CT NECK SPINE W/O DYE: CPT

## 2022-05-14 PROCEDURE — 70450 CT HEAD/BRAIN W/O DYE: CPT | Performed by: RADIOLOGY

## 2022-05-14 PROCEDURE — 72125 CT NECK SPINE W/O DYE: CPT | Performed by: RADIOLOGY

## 2022-05-14 PROCEDURE — 36415 COLL VENOUS BLD VENIPUNCTURE: CPT

## 2022-05-14 PROCEDURE — 70450 CT HEAD/BRAIN W/O DYE: CPT

## 2022-05-14 PROCEDURE — 99284 EMERGENCY DEPT VISIT MOD MDM: CPT

## 2022-05-14 ASSESSMENT — PAIN SCALES - GENERAL: PAINLEVEL_OUTOF10: 5

## 2022-05-14 ASSESSMENT — PAIN DESCRIPTION - PAIN TYPE: TYPE: ACUTE PAIN

## 2022-05-15 LAB
RAINBOW EXTRA TUBES HOLD SPECIMEN: NORMAL

## 2022-05-18 ENCOUNTER — HOSPITAL ENCOUNTER (OUTPATIENT)
Dept: NUCLEAR MEDICINE | Age: 42
Discharge: HOME | End: 2022-05-18
Attending: FAMILY MEDICINE

## 2022-05-18 DIAGNOSIS — R10.11 RUQ ABDOMINAL PAIN: ICD-10-CM

## 2022-05-18 PROCEDURE — 78227 HEPATOBIL SYST IMAGE W/DRUG: CPT

## 2022-05-18 PROCEDURE — 78226 HEPATOBILIARY SYSTEM IMAGING: CPT | Performed by: RADIOLOGY

## 2022-05-18 PROCEDURE — G1004 CDSM NDSC: HCPCS

## 2022-05-18 PROCEDURE — G1004 CDSM NDSC: HCPCS | Performed by: RADIOLOGY

## 2022-05-19 ENCOUNTER — TELEPHONE (OUTPATIENT)
Dept: FAMILY MEDICINE | Age: 42
End: 2022-05-19

## 2022-05-19 DIAGNOSIS — R10.11 RUQ ABDOMINAL PAIN: Primary | ICD-10-CM

## 2022-05-26 DIAGNOSIS — F31.0 BIPOLAR AFFECTIVE DISORDER, CURRENT EPISODE HYPOMANIC (CMD): ICD-10-CM

## 2022-05-26 DIAGNOSIS — E03.8 SUBCLINICAL HYPOTHYROIDISM: ICD-10-CM

## 2022-05-26 RX ORDER — THYROID,PORK 15 MG
TABLET ORAL
Qty: 30 TABLET | Refills: 0 | Status: SHIPPED | OUTPATIENT
Start: 2022-05-26 | End: 2022-06-23

## 2022-05-26 RX ORDER — QUETIAPINE FUMARATE 50 MG/1
50-100 TABLET, FILM COATED ORAL NIGHTLY PRN
Qty: 180 TABLET | OUTPATIENT
Start: 2022-05-26

## 2022-05-26 RX ORDER — DIVALPROEX SODIUM 250 MG/1
TABLET, EXTENDED RELEASE ORAL
Qty: 180 TABLET | Refills: 0 | OUTPATIENT
Start: 2022-05-26

## 2022-05-30 PROBLEM — R10.11 RUQ ABDOMINAL PAIN: Status: ACTIVE | Noted: 2022-05-30

## 2022-06-22 DIAGNOSIS — J30.89 NON-SEASONAL ALLERGIC RHINITIS DUE TO OTHER ALLERGIC TRIGGER: ICD-10-CM

## 2022-06-22 RX ORDER — RIVAROXABAN 20 MG/1
TABLET, FILM COATED ORAL
Qty: 90 TABLET | Refills: 1 | Status: SHIPPED | OUTPATIENT
Start: 2022-06-22 | End: 2022-11-28

## 2022-06-22 RX ORDER — HYDROXYZINE 50 MG/1
50 TABLET, FILM COATED ORAL NIGHTLY PRN
Qty: 90 TABLET | Refills: 1 | Status: SHIPPED | OUTPATIENT
Start: 2022-06-22 | End: 2022-11-28

## 2022-06-23 DIAGNOSIS — F31.0 BIPOLAR AFFECTIVE DISORDER, CURRENT EPISODE HYPOMANIC (CMD): ICD-10-CM

## 2022-06-23 DIAGNOSIS — M54.42 CHRONIC MIDLINE LOW BACK PAIN WITH BILATERAL SCIATICA: ICD-10-CM

## 2022-06-23 DIAGNOSIS — G89.4 CHRONIC PAIN SYNDROME: ICD-10-CM

## 2022-06-23 DIAGNOSIS — G89.29 CHRONIC MIDLINE LOW BACK PAIN WITH BILATERAL SCIATICA: ICD-10-CM

## 2022-06-23 DIAGNOSIS — M54.41 CHRONIC MIDLINE LOW BACK PAIN WITH BILATERAL SCIATICA: ICD-10-CM

## 2022-06-23 DIAGNOSIS — M79.7 FIBROMYALGIA: ICD-10-CM

## 2022-06-23 DIAGNOSIS — E03.8 SUBCLINICAL HYPOTHYROIDISM: ICD-10-CM

## 2022-06-23 RX ORDER — THYROID,PORK 15 MG
TABLET ORAL
Qty: 30 TABLET | Refills: 5 | Status: SHIPPED | OUTPATIENT
Start: 2022-06-23 | End: 2023-01-12 | Stop reason: SDUPTHER

## 2022-06-23 RX ORDER — PREGABALIN 150 MG/1
CAPSULE ORAL
Qty: 60 CAPSULE | Refills: 5 | Status: SHIPPED | OUTPATIENT
Start: 2022-06-23 | End: 2022-12-23 | Stop reason: SDUPTHER

## 2022-06-24 RX ORDER — DIVALPROEX SODIUM 500 MG/1
TABLET, EXTENDED RELEASE ORAL
Qty: 30 TABLET | OUTPATIENT
Start: 2022-06-24

## 2022-06-24 RX ORDER — DIVALPROEX SODIUM 250 MG/1
TABLET, EXTENDED RELEASE ORAL
Qty: 180 TABLET | Refills: 0 | OUTPATIENT
Start: 2022-06-24

## 2022-06-24 RX ORDER — QUETIAPINE FUMARATE 50 MG/1
50-100 TABLET, FILM COATED ORAL NIGHTLY PRN
Qty: 180 TABLET | OUTPATIENT
Start: 2022-06-24

## 2022-06-27 ENCOUNTER — OFFICE VISIT (OUTPATIENT)
Dept: GASTROENTEROLOGY | Age: 42
End: 2022-06-27
Attending: FAMILY MEDICINE

## 2022-06-27 VITALS — OXYGEN SATURATION: 96 % | DIASTOLIC BLOOD PRESSURE: 94 MMHG | SYSTOLIC BLOOD PRESSURE: 138 MMHG | HEART RATE: 92 BPM

## 2022-06-27 DIAGNOSIS — Z79.01 CURRENT USE OF LONG TERM ANTICOAGULATION: ICD-10-CM

## 2022-06-27 DIAGNOSIS — K52.9 CHRONIC DIARRHEA: Primary | ICD-10-CM

## 2022-06-27 DIAGNOSIS — K21.9 GASTROESOPHAGEAL REFLUX DISEASE WITHOUT ESOPHAGITIS: ICD-10-CM

## 2022-06-27 PROCEDURE — 99204 OFFICE O/P NEW MOD 45 MIN: CPT | Performed by: INTERNAL MEDICINE

## 2022-07-25 DIAGNOSIS — F31.0 BIPOLAR AFFECTIVE DISORDER, CURRENT EPISODE HYPOMANIC (CMD): ICD-10-CM

## 2022-07-26 ENCOUNTER — APPOINTMENT (OUTPATIENT)
Dept: FAMILY MEDICINE | Age: 42
End: 2022-07-26

## 2022-07-26 RX ORDER — DIVALPROEX SODIUM 500 MG/1
1000 TABLET, EXTENDED RELEASE ORAL AT BEDTIME
Qty: 180 TABLET | Refills: 1 | OUTPATIENT
Start: 2022-07-26

## 2022-07-26 RX ORDER — QUETIAPINE FUMARATE 50 MG/1
50-100 TABLET, FILM COATED ORAL NIGHTLY PRN
Qty: 60 TABLET | Refills: 0 | Status: SHIPPED | OUTPATIENT
Start: 2022-07-26 | End: 2022-08-16 | Stop reason: SDUPTHER

## 2022-07-26 RX ORDER — DIVALPROEX SODIUM 250 MG/1
TABLET, EXTENDED RELEASE ORAL
Qty: 180 TABLET | Refills: 0 | OUTPATIENT
Start: 2022-07-26

## 2022-08-16 ENCOUNTER — BEHAVIORAL HEALTH (OUTPATIENT)
Dept: PSYCHIATRY | Age: 42
End: 2022-08-16

## 2022-08-16 DIAGNOSIS — F31.31 BIPOLAR AFFECTIVE DISORDER, CURRENTLY DEPRESSED, MILD (CMD): Primary | ICD-10-CM

## 2022-08-16 PROCEDURE — 99215 OFFICE O/P EST HI 40 MIN: CPT | Performed by: NURSE PRACTITIONER

## 2022-08-16 RX ORDER — QUETIAPINE FUMARATE 200 MG/1
200-300 TABLET, FILM COATED ORAL NIGHTLY
Qty: 135 TABLET | Refills: 1 | Status: SHIPPED | OUTPATIENT
Start: 2022-08-16 | End: 2023-01-03

## 2022-08-16 RX ORDER — ALPRAZOLAM 1 MG/1
1 TABLET ORAL NIGHTLY PRN
Qty: 30 TABLET | Refills: 5 | Status: SHIPPED | OUTPATIENT
Start: 2022-08-16 | End: 2023-01-12 | Stop reason: SDUPTHER

## 2022-08-16 RX ORDER — VILAZODONE HYDROCHLORIDE 40 MG/1
40 TABLET ORAL DAILY
Qty: 30 TABLET | Refills: 5 | Status: SHIPPED | OUTPATIENT
Start: 2022-08-16 | End: 2023-01-03

## 2022-08-16 RX ORDER — DIVALPROEX SODIUM 500 MG/1
1000 TABLET, EXTENDED RELEASE ORAL AT BEDTIME
Qty: 180 TABLET | Refills: 1 | Status: SHIPPED | OUTPATIENT
Start: 2022-08-16 | End: 2023-01-03

## 2022-08-16 RX ORDER — QUETIAPINE FUMARATE 50 MG/1
50-100 TABLET, FILM COATED ORAL NIGHTLY PRN
Qty: 270 TABLET | Refills: 1 | Status: SHIPPED | OUTPATIENT
Start: 2022-08-16 | End: 2023-01-12 | Stop reason: SDUPTHER

## 2022-09-22 DIAGNOSIS — K21.9 GASTROESOPHAGEAL REFLUX DISEASE, UNSPECIFIED WHETHER ESOPHAGITIS PRESENT: ICD-10-CM

## 2022-09-22 DIAGNOSIS — A60.01 HERPES SIMPLEX INFECTION OF PENIS: ICD-10-CM

## 2022-09-22 RX ORDER — VALACYCLOVIR HYDROCHLORIDE 500 MG/1
500 TABLET, FILM COATED ORAL DAILY
Qty: 90 TABLET | Refills: 0 | Status: SHIPPED | OUTPATIENT
Start: 2022-09-22 | End: 2023-03-29 | Stop reason: SDUPTHER

## 2022-09-22 RX ORDER — PANTOPRAZOLE SODIUM 40 MG/1
40 TABLET, DELAYED RELEASE ORAL 2 TIMES DAILY
Qty: 180 TABLET | Refills: 0 | Status: SHIPPED | OUTPATIENT
Start: 2022-09-22 | End: 2022-11-23 | Stop reason: SDUPTHER

## 2022-10-05 ENCOUNTER — APPOINTMENT (OUTPATIENT)
Dept: FAMILY MEDICINE | Age: 42
End: 2022-10-05

## 2022-10-25 DIAGNOSIS — J30.89 NON-SEASONAL ALLERGIC RHINITIS DUE TO OTHER ALLERGIC TRIGGER: ICD-10-CM

## 2022-11-17 ENCOUNTER — NURSE ONLY (OUTPATIENT)
Dept: FAMILY MEDICINE | Age: 42
End: 2022-11-17

## 2022-11-17 DIAGNOSIS — R03.0 BLOOD PRESSURE ELEVATED WITHOUT HISTORY OF HTN: Primary | ICD-10-CM

## 2022-11-17 DIAGNOSIS — Z23 NEED FOR COVID-19 VACCINE: ICD-10-CM

## 2022-11-17 DIAGNOSIS — Z23 NEED FOR VACCINATION: ICD-10-CM

## 2022-11-17 PROCEDURE — 0134A COVID-19 18Y+ MODERNA BIVALENT BOOSTER VACCINE: CPT | Performed by: INTERNAL MEDICINE

## 2022-11-17 PROCEDURE — G0008 ADMIN INFLUENZA VIRUS VAC: HCPCS | Performed by: STUDENT IN AN ORGANIZED HEALTH CARE EDUCATION/TRAINING PROGRAM

## 2022-11-17 PROCEDURE — 90686 IIV4 VACC NO PRSV 0.5 ML IM: CPT | Performed by: STUDENT IN AN ORGANIZED HEALTH CARE EDUCATION/TRAINING PROGRAM

## 2022-11-17 PROCEDURE — 91313 COVID-19 18Y+ MODERNA BIVALENT BOOSTER VACCINE: CPT | Performed by: INTERNAL MEDICINE

## 2022-11-21 RX ORDER — RIVAROXABAN 20 MG/1
TABLET, FILM COATED ORAL
Qty: 90 TABLET | Refills: 1 | OUTPATIENT
Start: 2022-11-21

## 2022-11-21 RX ORDER — HYDROXYZINE 50 MG/1
50 TABLET, FILM COATED ORAL NIGHTLY PRN
Qty: 90 TABLET | Refills: 1 | OUTPATIENT
Start: 2022-11-21

## 2022-11-23 DIAGNOSIS — J30.89 NON-SEASONAL ALLERGIC RHINITIS DUE TO OTHER ALLERGIC TRIGGER: ICD-10-CM

## 2022-11-23 DIAGNOSIS — K21.9 GASTROESOPHAGEAL REFLUX DISEASE, UNSPECIFIED WHETHER ESOPHAGITIS PRESENT: ICD-10-CM

## 2022-11-23 RX ORDER — PANTOPRAZOLE SODIUM 40 MG/1
40 TABLET, DELAYED RELEASE ORAL 2 TIMES DAILY
Qty: 180 TABLET | Refills: 0 | Status: SHIPPED | OUTPATIENT
Start: 2022-11-23 | End: 2023-03-29 | Stop reason: SDUPTHER

## 2022-11-28 RX ORDER — RIVAROXABAN 20 MG/1
TABLET, FILM COATED ORAL
Qty: 90 TABLET | Refills: 0 | Status: SHIPPED | OUTPATIENT
Start: 2022-11-28 | End: 2023-01-03

## 2022-11-28 RX ORDER — HYDROXYZINE 50 MG/1
50 TABLET, FILM COATED ORAL NIGHTLY PRN
Qty: 90 TABLET | Refills: 0 | Status: SHIPPED | OUTPATIENT
Start: 2022-11-28 | End: 2023-03-29

## 2022-12-13 ENCOUNTER — APPOINTMENT (OUTPATIENT)
Dept: GASTROENTEROLOGY | Age: 42
End: 2022-12-13

## 2022-12-20 ENCOUNTER — APPOINTMENT (OUTPATIENT)
Dept: GASTROENTEROLOGY | Age: 42
End: 2022-12-20

## 2022-12-20 DIAGNOSIS — G89.4 CHRONIC PAIN SYNDROME: ICD-10-CM

## 2022-12-20 DIAGNOSIS — G89.29 CHRONIC MIDLINE LOW BACK PAIN WITH BILATERAL SCIATICA: ICD-10-CM

## 2022-12-20 DIAGNOSIS — M54.41 CHRONIC MIDLINE LOW BACK PAIN WITH BILATERAL SCIATICA: ICD-10-CM

## 2022-12-20 DIAGNOSIS — M54.42 CHRONIC MIDLINE LOW BACK PAIN WITH BILATERAL SCIATICA: ICD-10-CM

## 2022-12-20 DIAGNOSIS — M79.7 FIBROMYALGIA: ICD-10-CM

## 2022-12-23 ENCOUNTER — APPOINTMENT (OUTPATIENT)
Dept: FAMILY MEDICINE | Age: 42
End: 2022-12-23

## 2022-12-23 DIAGNOSIS — M79.7 FIBROMYALGIA: ICD-10-CM

## 2022-12-23 DIAGNOSIS — M54.41 CHRONIC MIDLINE LOW BACK PAIN WITH BILATERAL SCIATICA: ICD-10-CM

## 2022-12-23 DIAGNOSIS — M54.42 CHRONIC MIDLINE LOW BACK PAIN WITH BILATERAL SCIATICA: ICD-10-CM

## 2022-12-23 DIAGNOSIS — G89.29 CHRONIC MIDLINE LOW BACK PAIN WITH BILATERAL SCIATICA: ICD-10-CM

## 2022-12-23 DIAGNOSIS — G89.4 CHRONIC PAIN SYNDROME: ICD-10-CM

## 2022-12-23 RX ORDER — PREGABALIN 150 MG/1
150 CAPSULE ORAL 2 TIMES DAILY
Qty: 60 CAPSULE | Refills: 0 | Status: SHIPPED | OUTPATIENT
Start: 2022-12-23 | End: 2023-01-26 | Stop reason: SDUPTHER

## 2022-12-27 RX ORDER — PREGABALIN 150 MG/1
CAPSULE ORAL
Qty: 60 CAPSULE | Refills: 3 | OUTPATIENT
Start: 2022-12-27

## 2023-01-03 DIAGNOSIS — F31.0 BIPOLAR DISORDER, CURRENT EPISODE HYPOMANIC (CMD): ICD-10-CM

## 2023-01-03 RX ORDER — RIVAROXABAN 20 MG/1
TABLET, FILM COATED ORAL
Qty: 30 TABLET | Refills: 0 | Status: SHIPPED | OUTPATIENT
Start: 2023-01-03 | End: 2023-03-27

## 2023-01-03 RX ORDER — VILAZODONE HYDROCHLORIDE 40 MG/1
TABLET ORAL
Qty: 90 TABLET | Refills: 0 | Status: SHIPPED | OUTPATIENT
Start: 2023-01-03 | End: 2023-01-12 | Stop reason: SDUPTHER

## 2023-01-03 RX ORDER — QUETIAPINE FUMARATE 200 MG/1
200-300 TABLET, FILM COATED ORAL NIGHTLY
Qty: 135 TABLET | Refills: 0 | Status: SHIPPED | OUTPATIENT
Start: 2023-01-03 | End: 2023-01-12 | Stop reason: SDUPTHER

## 2023-01-03 RX ORDER — DIVALPROEX SODIUM 500 MG/1
1000 TABLET, EXTENDED RELEASE ORAL AT BEDTIME
Qty: 180 TABLET | Refills: 0 | Status: SHIPPED | OUTPATIENT
Start: 2023-01-03 | End: 2023-01-12 | Stop reason: SDUPTHER

## 2023-01-08 DIAGNOSIS — E03.8 SUBCLINICAL HYPOTHYROIDISM: ICD-10-CM

## 2023-01-08 DIAGNOSIS — G89.4 CHRONIC PAIN SYNDROME: ICD-10-CM

## 2023-01-08 DIAGNOSIS — G89.29 CHRONIC MIDLINE LOW BACK PAIN WITH BILATERAL SCIATICA: ICD-10-CM

## 2023-01-08 DIAGNOSIS — M54.41 CHRONIC MIDLINE LOW BACK PAIN WITH BILATERAL SCIATICA: ICD-10-CM

## 2023-01-08 DIAGNOSIS — M54.42 CHRONIC MIDLINE LOW BACK PAIN WITH BILATERAL SCIATICA: ICD-10-CM

## 2023-01-08 DIAGNOSIS — M79.7 FIBROMYALGIA: ICD-10-CM

## 2023-01-10 ENCOUNTER — LAB SERVICES (OUTPATIENT)
Dept: LAB | Age: 43
End: 2023-01-10

## 2023-01-10 ENCOUNTER — OFFICE VISIT (OUTPATIENT)
Dept: FAMILY MEDICINE | Age: 43
End: 2023-01-10

## 2023-01-10 VITALS
HEART RATE: 92 BPM | HEIGHT: 72 IN | WEIGHT: 279.5 LBS | BODY MASS INDEX: 37.86 KG/M2 | SYSTOLIC BLOOD PRESSURE: 118 MMHG | TEMPERATURE: 97.9 F | DIASTOLIC BLOOD PRESSURE: 98 MMHG

## 2023-01-10 DIAGNOSIS — F31.0 BIPOLAR AFFECTIVE DISORDER, CURRENT EPISODE HYPOMANIC (CMD): ICD-10-CM

## 2023-01-10 DIAGNOSIS — G89.4 CHRONIC PAIN SYNDROME: ICD-10-CM

## 2023-01-10 DIAGNOSIS — E66.9 OBESITY (BMI 30-39.9): ICD-10-CM

## 2023-01-10 DIAGNOSIS — E78.5 DYSLIPIDEMIA: ICD-10-CM

## 2023-01-10 DIAGNOSIS — M79.7 FIBROMYALGIA: ICD-10-CM

## 2023-01-10 DIAGNOSIS — Z76.89 ENCOUNTER TO ESTABLISH CARE: Primary | ICD-10-CM

## 2023-01-10 DIAGNOSIS — K52.9 CHRONIC DIARRHEA: ICD-10-CM

## 2023-01-10 DIAGNOSIS — E03.8 SUBCLINICAL HYPOTHYROIDISM: ICD-10-CM

## 2023-01-10 DIAGNOSIS — Z86.711 HISTORY OF PULMONARY EMBOLISM: ICD-10-CM

## 2023-01-10 DIAGNOSIS — K21.9 GASTROESOPHAGEAL REFLUX DISEASE WITHOUT ESOPHAGITIS: ICD-10-CM

## 2023-01-10 DIAGNOSIS — J30.89 NON-SEASONAL ALLERGIC RHINITIS DUE TO OTHER ALLERGIC TRIGGER: ICD-10-CM

## 2023-01-10 DIAGNOSIS — K57.30 DIVERTICULOSIS OF COLON: ICD-10-CM

## 2023-01-10 DIAGNOSIS — M54.41 CHRONIC MIDLINE LOW BACK PAIN WITH BILATERAL SCIATICA: ICD-10-CM

## 2023-01-10 DIAGNOSIS — F41.8 DEPRESSION WITH ANXIETY: ICD-10-CM

## 2023-01-10 DIAGNOSIS — M54.42 CHRONIC MIDLINE LOW BACK PAIN WITH BILATERAL SCIATICA: ICD-10-CM

## 2023-01-10 DIAGNOSIS — A60.01 HERPES SIMPLEX INFECTION OF PENIS: ICD-10-CM

## 2023-01-10 DIAGNOSIS — F31.0 BIPOLAR DISORDER, CURRENT EPISODE HYPOMANIC (CMD): ICD-10-CM

## 2023-01-10 DIAGNOSIS — Z79.01 CURRENT USE OF LONG TERM ANTICOAGULATION: ICD-10-CM

## 2023-01-10 DIAGNOSIS — M54.16 LUMBAR RADICULOPATHY: ICD-10-CM

## 2023-01-10 DIAGNOSIS — G89.29 CHRONIC MIDLINE LOW BACK PAIN WITH BILATERAL SCIATICA: ICD-10-CM

## 2023-01-10 PROBLEM — I26.99 PULMONARY EMBOLUS (CMD): Status: ACTIVE | Noted: 2019-02-28

## 2023-01-10 LAB
ALBUMIN SERPL-MCNC: 3.2 G/DL (ref 3.6–5.1)
ALBUMIN/GLOB SERPL: 0.9 {RATIO} (ref 1–2.4)
ALP SERPL-CCNC: 54 UNITS/L (ref 45–117)
ALT SERPL-CCNC: 35 UNITS/L
ANION GAP SERPL CALC-SCNC: 11 MMOL/L (ref 7–19)
AST SERPL-CCNC: 15 UNITS/L
BASOPHILS # BLD: 0.1 K/MCL (ref 0–0.3)
BASOPHILS NFR BLD: 1 %
BILIRUB SERPL-MCNC: 0.3 MG/DL (ref 0.2–1)
BUN SERPL-MCNC: 18 MG/DL (ref 6–20)
BUN/CREAT SERPL: 17 (ref 7–25)
CALCIUM SERPL-MCNC: 9 MG/DL (ref 8.4–10.2)
CHLORIDE SERPL-SCNC: 112 MMOL/L (ref 97–110)
CHOLEST SERPL-MCNC: 204 MG/DL
CHOLEST/HDLC SERPL: 6 {RATIO}
CO2 SERPL-SCNC: 26 MMOL/L (ref 21–32)
CREAT SERPL-MCNC: 1.06 MG/DL (ref 0.67–1.17)
DEPRECATED RDW RBC: 49.5 FL (ref 39–50)
EOSINOPHIL # BLD: 0.3 K/MCL (ref 0–0.5)
EOSINOPHIL NFR BLD: 5 %
ERYTHROCYTE [DISTWIDTH] IN BLOOD: 15.3 % (ref 11–15)
FASTING DURATION TIME PATIENT: 12 HOURS (ref 0–999)
FASTING DURATION TIME PATIENT: 12 HOURS (ref 0–999)
GFR SERPLBLD BASED ON 1.73 SQ M-ARVRAT: 90 ML/MIN
GLOBULIN SER-MCNC: 3.5 G/DL (ref 2–4)
GLUCOSE SERPL-MCNC: 96 MG/DL (ref 70–99)
HCT VFR BLD CALC: 44 % (ref 39–51)
HDLC SERPL-MCNC: 34 MG/DL
HGB BLD-MCNC: 14.4 G/DL (ref 13–17)
IMM GRANULOCYTES # BLD AUTO: 0 K/MCL (ref 0–0.2)
IMM GRANULOCYTES # BLD: 0 %
LDLC SERPL CALC-MCNC: 123 MG/DL
LYMPHOCYTES # BLD: 2.6 K/MCL (ref 1–4.8)
LYMPHOCYTES NFR BLD: 44 %
MCH RBC QN AUTO: 28.9 PG (ref 26–34)
MCHC RBC AUTO-ENTMCNC: 32.7 G/DL (ref 32–36.5)
MCV RBC AUTO: 88.4 FL (ref 78–100)
MONOCYTES # BLD: 0.7 K/MCL (ref 0.3–0.9)
MONOCYTES NFR BLD: 12 %
NEUTROPHILS # BLD: 2.3 K/MCL (ref 1.8–7.7)
NEUTROPHILS NFR BLD: 38 %
NONHDLC SERPL-MCNC: 170 MG/DL
NRBC BLD MANUAL-RTO: 0 /100 WBC
PLATELET # BLD AUTO: 223 K/MCL (ref 140–450)
POTASSIUM SERPL-SCNC: 4.3 MMOL/L (ref 3.4–5.1)
PROT SERPL-MCNC: 6.7 G/DL (ref 6.4–8.2)
RBC # BLD: 4.98 MIL/MCL (ref 4.5–5.9)
SODIUM SERPL-SCNC: 145 MMOL/L (ref 135–145)
TRIGL SERPL-MCNC: 237 MG/DL
TSH SERPL-ACNC: 4.94 MCUNITS/ML (ref 0.35–5)
WBC # BLD: 6.1 K/MCL (ref 4.2–11)

## 2023-01-10 PROCEDURE — 85025 COMPLETE CBC W/AUTO DIFF WBC: CPT | Performed by: CLINICAL MEDICAL LABORATORY

## 2023-01-10 PROCEDURE — 99213 OFFICE O/P EST LOW 20 MIN: CPT

## 2023-01-10 PROCEDURE — 80164 ASSAY DIPROPYLACETIC ACD TOT: CPT | Performed by: CLINICAL MEDICAL LABORATORY

## 2023-01-10 PROCEDURE — 84443 ASSAY THYROID STIM HORMONE: CPT | Performed by: CLINICAL MEDICAL LABORATORY

## 2023-01-10 PROCEDURE — 96127 BRIEF EMOTIONAL/BEHAV ASSMT: CPT

## 2023-01-10 PROCEDURE — 80053 COMPREHEN METABOLIC PANEL: CPT | Performed by: CLINICAL MEDICAL LABORATORY

## 2023-01-10 PROCEDURE — 80061 LIPID PANEL: CPT | Performed by: CLINICAL MEDICAL LABORATORY

## 2023-01-10 RX ORDER — METHOCARBAMOL 750 MG/1
TABLET, FILM COATED ORAL
Qty: 90 TABLET | Refills: 3 | OUTPATIENT
Start: 2023-01-10

## 2023-01-10 RX ORDER — PHENOL 1.4 %
10 AEROSOL, SPRAY (ML) MUCOUS MEMBRANE NIGHTLY
COMMUNITY
End: 2023-02-28 | Stop reason: SDUPTHER

## 2023-01-10 RX ORDER — THYROID,PORK 15 MG
TABLET ORAL
Qty: 30 TABLET | Refills: 5 | OUTPATIENT
Start: 2023-01-10

## 2023-01-10 ASSESSMENT — ANXIETY QUESTIONNAIRES
7. FEELING AFRAID AS IF SOMETHING AWFUL MIGHT HAPPEN: 0
1. FEELING NERVOUS, ANXIOUS, OR ON EDGE: 1
5. BEING SO RESTLESS THAT IT IS HARD TO SIT STILL: 0
6. BECOMING EASILY ANNOYED OR IRRITABLE: SEVERAL DAYS
4. TROUBLE RELAXING: 1
2. NOT BEING ABLE TO STOP OR CONTROL WORRYING: 0
4. TROUBLE RELAXING: SEVERAL DAYS
1. FEELING NERVOUS, ANXIOUS, OR ON EDGE: SEVERAL DAYS
3. WORRYING TOO MUCH ABOUT DIFFERENT THINGS: SEVERAL DAYS
7. FEELING AFRAID AS IF SOMETHING AWFUL MIGHT HAPPEN: NOT AT ALL
6. BECOMING EASILY ANNOYED OR IRRITABLE: 1
GAD7 TOTAL SCORE: 4
2. NOT BEING ABLE TO STOP OR CONTROL WORRYING: NOT AT ALL
5. BEING SO RESTLESS THAT IT IS HARD TO SIT STILL: NOT AT ALL
3. WORRYING TOO MUCH ABOUT DIFFERENT THINGS: 1
IF YOU CHECKED OFF ANY PROBLEMS ON THIS QUESTIONNAIRE, HOW DIFFICULT HAVE THESE PROBLEMS MADE IT FOR YOU TO DO YOUR WORK, TAKE CARE OF THINGS AT HOME, OR GET ALONG WITH OTHER PEOPLE: SOMEWHAT DIFFICULT

## 2023-01-10 ASSESSMENT — PATIENT HEALTH QUESTIONNAIRE - PHQ9
CLINICAL INTERPRETATION OF PHQ9 SCORE: MILD DEPRESSION
9. THOUGHTS THAT YOU WOULD BE BETTER OFF DEAD, OR OF HURTING YOURSELF: NOT AT ALL
3. TROUBLE FALLING OR STAYING ASLEEP OR SLEEPING TOO MUCH: MORE THAN HALF THE DAYS
10. IF YOU CHECKED OFF ANY PROBLEMS, HOW DIFFICULT HAVE THESE PROBLEMS MADE IT FOR YOU TO DO YOUR WORK, TAKE CARE OF THINGS AT HOME, OR GET ALONG WITH OTHER PEOPLE: SOMEWHAT DIFFICULT
7. TROUBLE CONCENTRATING ON THINGS, SUCH AS READING THE NEWSPAPER OR WATCHING TELEVISION: NOT AT ALL
6. FEELING BAD ABOUT YOURSELF - OR THAT YOU ARE A FAILURE OR HAVE LET YOURSELF OR YOUR FAMILY DOWN: NOT AT ALL
1. LITTLE INTEREST OR PLEASURE IN DOING THINGS: MORE THAN HALF THE DAYS
SUM OF ALL RESPONSES TO PHQ9 QUESTIONS 1 AND 2: 3
SUM OF ALL RESPONSES TO PHQ9 QUESTIONS 1 AND 2: 3
4. FEELING TIRED OR HAVING LITTLE ENERGY: MORE THAN HALF THE DAYS
5. POOR APPETITE, WEIGHT LOSS, OR OVEREATING: SEVERAL DAYS
2. FEELING DOWN, DEPRESSED OR HOPELESS: SEVERAL DAYS
CLINICAL INTERPRETATION OF PHQ2 SCORE: FURTHER SCREENING NEEDED
8. MOVING OR SPEAKING SO SLOWLY THAT OTHER PEOPLE COULD HAVE NOTICED. OR THE OPPOSITE, BEING SO FIGETY OR RESTLESS THAT YOU HAVE BEEN MOVING AROUND A LOT MORE THAN USUAL: SEVERAL DAYS
SUM OF ALL RESPONSES TO PHQ QUESTIONS 1-9: 9

## 2023-01-11 LAB — VALPROATE SERPL-MCNC: 48 MCG/ML (ref 50–125)

## 2023-01-12 ENCOUNTER — NURSE TRIAGE (OUTPATIENT)
Dept: FAMILY MEDICINE | Age: 43
End: 2023-01-12

## 2023-01-12 ENCOUNTER — NURSE ONLY (OUTPATIENT)
Dept: FAMILY MEDICINE | Age: 43
End: 2023-01-12

## 2023-01-12 ENCOUNTER — TELEPHONE (OUTPATIENT)
Dept: FAMILY MEDICINE | Age: 43
End: 2023-01-12

## 2023-01-12 ENCOUNTER — BEHAVIORAL HEALTH (OUTPATIENT)
Dept: PSYCHIATRY | Age: 43
End: 2023-01-12

## 2023-01-12 VITALS — DIASTOLIC BLOOD PRESSURE: 82 MMHG | SYSTOLIC BLOOD PRESSURE: 110 MMHG

## 2023-01-12 DIAGNOSIS — F31.31 BIPOLAR AFFECTIVE DISORDER, CURRENTLY DEPRESSED, MILD (CMD): Primary | ICD-10-CM

## 2023-01-12 DIAGNOSIS — E03.8 SUBCLINICAL HYPOTHYROIDISM: ICD-10-CM

## 2023-01-12 DIAGNOSIS — Z79.899 MEDICATION MANAGEMENT: ICD-10-CM

## 2023-01-12 DIAGNOSIS — I10 HYPERTENSION, UNSPECIFIED TYPE: Primary | ICD-10-CM

## 2023-01-12 PROCEDURE — 99214 OFFICE O/P EST MOD 30 MIN: CPT | Performed by: NURSE PRACTITIONER

## 2023-01-12 RX ORDER — QUETIAPINE FUMARATE 50 MG/1
50-100 TABLET, FILM COATED ORAL NIGHTLY PRN
Qty: 270 TABLET | Refills: 1 | Status: SHIPPED | OUTPATIENT
Start: 2023-01-12 | End: 2023-09-05 | Stop reason: DRUGHIGH

## 2023-01-12 RX ORDER — THYROID 15 MG/1
15 TABLET ORAL DAILY
Qty: 90 TABLET | Refills: 3 | Status: SHIPPED | OUTPATIENT
Start: 2023-01-12 | End: 2023-01-12 | Stop reason: SDUPTHER

## 2023-01-12 RX ORDER — ALPRAZOLAM 1 MG/1
1 TABLET ORAL NIGHTLY PRN
Qty: 30 TABLET | Refills: 5 | Status: SHIPPED | OUTPATIENT
Start: 2023-02-02 | End: 2023-08-23

## 2023-01-12 RX ORDER — DIVALPROEX SODIUM 500 MG/1
1000 TABLET, EXTENDED RELEASE ORAL AT BEDTIME
Qty: 180 TABLET | Refills: 1 | Status: SHIPPED | OUTPATIENT
Start: 2023-01-12 | End: 2023-09-05 | Stop reason: SDUPTHER

## 2023-01-12 RX ORDER — QUETIAPINE FUMARATE 200 MG/1
200-300 TABLET, FILM COATED ORAL NIGHTLY
Qty: 135 TABLET | Refills: 1 | Status: SHIPPED | OUTPATIENT
Start: 2023-01-12 | End: 2023-09-05 | Stop reason: SDUPTHER

## 2023-01-12 RX ORDER — THYROID 15 MG/1
15 TABLET ORAL DAILY
Qty: 90 TABLET | Refills: 3 | Status: SHIPPED | OUTPATIENT
Start: 2023-01-12

## 2023-01-12 RX ORDER — VILAZODONE HYDROCHLORIDE 40 MG/1
40 TABLET ORAL DAILY
Qty: 90 TABLET | Refills: 1 | Status: SHIPPED | OUTPATIENT
Start: 2023-01-12 | End: 2023-09-05 | Stop reason: SDUPTHER

## 2023-01-20 ENCOUNTER — HOSPITAL ENCOUNTER (EMERGENCY)
Age: 43
Discharge: HOME | End: 2023-01-20
Attending: EMERGENCY MEDICINE

## 2023-01-20 ENCOUNTER — APPOINTMENT (OUTPATIENT)
Dept: CT IMAGING | Age: 43
End: 2023-01-20
Attending: EMERGENCY MEDICINE

## 2023-01-20 VITALS
TEMPERATURE: 99.7 F | HEIGHT: 71 IN | SYSTOLIC BLOOD PRESSURE: 132 MMHG | RESPIRATION RATE: 19 BRPM | HEART RATE: 92 BPM | BODY MASS INDEX: 38.36 KG/M2 | OXYGEN SATURATION: 94 % | WEIGHT: 274.03 LBS | DIASTOLIC BLOOD PRESSURE: 92 MMHG
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
## 2023-01-20 DIAGNOSIS — S09.90XA CLOSED HEAD INJURY WITHOUT LOSS OF CONSCIOUSNESS, INITIAL ENCOUNTER: Primary | ICD-10-CM

## 2023-01-20 LAB
ATRIAL RATE (BPM): 99
DIASTOLIC BLOOD PRESSURE: 92
P AXIS (DEGREES): 47
PR-INTERVAL (MSEC): 148
QRS-INTERVAL (MSEC): 78
QT-INTERVAL (MSEC): 336
QTC: 431
R AXIS (DEGREES): 60
REPORT TEXT: NORMAL
SYSTOLIC BLOOD PRESSURE: 151
T AXIS (DEGREES): 58
VENTRICULAR RATE EKG/MIN (BPM): 99

## 2023-01-20 PROCEDURE — 70450 CT HEAD/BRAIN W/O DYE: CPT

## 2023-01-20 PROCEDURE — G1004 CDSM NDSC: HCPCS | Performed by: RADIOLOGY

## 2023-01-20 PROCEDURE — 70450 CT HEAD/BRAIN W/O DYE: CPT | Performed by: RADIOLOGY

## 2023-01-20 PROCEDURE — G1004 CDSM NDSC: HCPCS

## 2023-01-20 PROCEDURE — 99284 EMERGENCY DEPT VISIT MOD MDM: CPT | Performed by: EMERGENCY MEDICINE

## 2023-01-20 PROCEDURE — 93005 ELECTROCARDIOGRAM TRACING: CPT | Performed by: EMERGENCY MEDICINE

## 2023-01-20 PROCEDURE — 72125 CT NECK SPINE W/O DYE: CPT | Performed by: RADIOLOGY

## 2023-01-20 PROCEDURE — 99284 EMERGENCY DEPT VISIT MOD MDM: CPT

## 2023-01-20 PROCEDURE — 93010 ELECTROCARDIOGRAM REPORT: CPT | Performed by: EMERGENCY MEDICINE

## 2023-01-20 PROCEDURE — 72125 CT NECK SPINE W/O DYE: CPT

## 2023-01-20 ASSESSMENT — ENCOUNTER SYMPTOMS
SHORTNESS OF BREATH: 0
VOMITING: 0
COUGH: 0
CHEST TIGHTNESS: 0
SORE THROAT: 0
ABDOMINAL PAIN: 0
LIGHT-HEADEDNESS: 0
HEADACHES: 0
SEIZURES: 0
WOUND: 0
WEAKNESS: 0
CHILLS: 0
FEVER: 0
DIARRHEA: 0
BACK PAIN: 1

## 2023-01-20 ASSESSMENT — PAIN SCALES - GENERAL
PAINLEVEL_OUTOF10: 6
PAINLEVEL_OUTOF10: 5

## 2023-01-20 ASSESSMENT — MOVEMENT AND STRENGTH ASSESSMENTS
HEAD_NECK: FULL RANGE OF MOTION
FACE_JAW: FACE SYMMETRICAL;FULL RANGE OF MOTION
ALL_EXTREMITIES: EQUAL STRENGTH/TONE/MOVEMENT

## 2023-01-20 ASSESSMENT — PAIN DESCRIPTION - PAIN TYPE: TYPE: ACUTE PAIN

## 2023-01-24 ENCOUNTER — WALK IN (OUTPATIENT)
Dept: URGENT CARE | Age: 43
End: 2023-01-24

## 2023-01-24 VITALS
DIASTOLIC BLOOD PRESSURE: 91 MMHG | SYSTOLIC BLOOD PRESSURE: 127 MMHG | OXYGEN SATURATION: 96 % | TEMPERATURE: 97.9 F | HEART RATE: 90 BPM

## 2023-01-24 DIAGNOSIS — M54.6 ACUTE BILATERAL THORACIC BACK PAIN: ICD-10-CM

## 2023-01-24 DIAGNOSIS — M54.2 NECK PAIN: Primary | ICD-10-CM

## 2023-01-24 PROCEDURE — 99214 OFFICE O/P EST MOD 30 MIN: CPT | Performed by: PHYSICIAN ASSISTANT

## 2023-01-24 RX ORDER — PREDNISONE 20 MG/1
40 TABLET ORAL DAILY
Qty: 8 TABLET | Refills: 0 | Status: SHIPPED | OUTPATIENT
Start: 2023-01-24 | End: 2023-01-28

## 2023-01-26 DIAGNOSIS — M54.41 CHRONIC MIDLINE LOW BACK PAIN WITH BILATERAL SCIATICA: ICD-10-CM

## 2023-01-26 DIAGNOSIS — M54.42 CHRONIC MIDLINE LOW BACK PAIN WITH BILATERAL SCIATICA: ICD-10-CM

## 2023-01-26 DIAGNOSIS — G89.29 CHRONIC MIDLINE LOW BACK PAIN WITH BILATERAL SCIATICA: ICD-10-CM

## 2023-01-26 DIAGNOSIS — M79.7 FIBROMYALGIA: ICD-10-CM

## 2023-01-26 DIAGNOSIS — G89.4 CHRONIC PAIN SYNDROME: ICD-10-CM

## 2023-01-26 RX ORDER — PREGABALIN 150 MG/1
150 CAPSULE ORAL 2 TIMES DAILY
Qty: 60 CAPSULE | Refills: 0 | Status: SHIPPED | OUTPATIENT
Start: 2023-01-26 | End: 2023-03-07 | Stop reason: SDUPTHER

## 2023-02-06 ENCOUNTER — TELEPHONE (OUTPATIENT)
Dept: FAMILY MEDICINE | Age: 43
End: 2023-02-06

## 2023-02-24 DIAGNOSIS — G89.29 CHRONIC MIDLINE LOW BACK PAIN WITH BILATERAL SCIATICA: ICD-10-CM

## 2023-02-24 DIAGNOSIS — M54.41 CHRONIC MIDLINE LOW BACK PAIN WITH BILATERAL SCIATICA: ICD-10-CM

## 2023-02-24 DIAGNOSIS — G89.4 CHRONIC PAIN SYNDROME: ICD-10-CM

## 2023-02-24 DIAGNOSIS — M79.7 FIBROMYALGIA: ICD-10-CM

## 2023-02-24 DIAGNOSIS — M54.42 CHRONIC MIDLINE LOW BACK PAIN WITH BILATERAL SCIATICA: ICD-10-CM

## 2023-02-24 RX ORDER — PREGABALIN 150 MG/1
CAPSULE ORAL
Qty: 60 CAPSULE | Refills: 0 | OUTPATIENT
Start: 2023-02-24

## 2023-02-24 RX ORDER — METHOCARBAMOL 750 MG/1
TABLET, FILM COATED ORAL
Qty: 90 TABLET | Refills: 3 | OUTPATIENT
Start: 2023-02-24

## 2023-02-28 DIAGNOSIS — G89.4 CHRONIC PAIN SYNDROME: ICD-10-CM

## 2023-02-28 DIAGNOSIS — M54.41 CHRONIC MIDLINE LOW BACK PAIN WITH BILATERAL SCIATICA: ICD-10-CM

## 2023-02-28 DIAGNOSIS — M79.7 FIBROMYALGIA: ICD-10-CM

## 2023-02-28 DIAGNOSIS — G89.29 CHRONIC MIDLINE LOW BACK PAIN WITH BILATERAL SCIATICA: ICD-10-CM

## 2023-02-28 DIAGNOSIS — A60.01 HERPES SIMPLEX INFECTION OF PENIS: ICD-10-CM

## 2023-02-28 DIAGNOSIS — M54.42 CHRONIC MIDLINE LOW BACK PAIN WITH BILATERAL SCIATICA: ICD-10-CM

## 2023-02-28 RX ORDER — PREGABALIN 150 MG/1
150 CAPSULE ORAL 2 TIMES DAILY
Qty: 60 CAPSULE | Refills: 0 | OUTPATIENT
Start: 2023-02-28

## 2023-02-28 ASSESSMENT — PATIENT HEALTH QUESTIONNAIRE - PHQ9
CLINICAL INTERPRETATION OF PHQ2 SCORE: 2
CLINICAL INTERPRETATION OF PHQ2 SCORE: NO FURTHER SCREENING NEEDED
2. FEELING DOWN, DEPRESSED OR HOPELESS: SEVERAL DAYS
SUM OF ALL RESPONSES TO PHQ9 QUESTIONS 1 AND 2: 2
1. LITTLE INTEREST OR PLEASURE IN DOING THINGS: SEVERAL DAYS

## 2023-03-01 DIAGNOSIS — M54.41 CHRONIC MIDLINE LOW BACK PAIN WITH BILATERAL SCIATICA: ICD-10-CM

## 2023-03-01 DIAGNOSIS — G89.29 CHRONIC MIDLINE LOW BACK PAIN WITH BILATERAL SCIATICA: ICD-10-CM

## 2023-03-01 DIAGNOSIS — M54.42 CHRONIC MIDLINE LOW BACK PAIN WITH BILATERAL SCIATICA: ICD-10-CM

## 2023-03-01 DIAGNOSIS — M79.7 FIBROMYALGIA: ICD-10-CM

## 2023-03-01 DIAGNOSIS — G89.4 CHRONIC PAIN SYNDROME: ICD-10-CM

## 2023-03-01 RX ORDER — METHOCARBAMOL 750 MG/1
750 TABLET, FILM COATED ORAL 3 TIMES DAILY
Qty: 90 TABLET | Refills: 5 | OUTPATIENT
Start: 2023-03-01

## 2023-03-01 RX ORDER — PHENOL 1.4 %
10 AEROSOL, SPRAY (ML) MUCOUS MEMBRANE NIGHTLY
Qty: 90 TABLET | Refills: 0 | Status: SHIPPED | OUTPATIENT
Start: 2023-03-01

## 2023-03-01 RX ORDER — VALACYCLOVIR HYDROCHLORIDE 500 MG/1
500 TABLET, FILM COATED ORAL DAILY
Qty: 90 TABLET | Refills: 0 | OUTPATIENT
Start: 2023-03-01

## 2023-03-01 RX ORDER — PREGABALIN 150 MG/1
CAPSULE ORAL
Qty: 60 CAPSULE | Refills: 0 | OUTPATIENT
Start: 2023-03-01

## 2023-03-03 ENCOUNTER — LAB SERVICES (OUTPATIENT)
Dept: LAB | Age: 43
End: 2023-03-03

## 2023-03-03 DIAGNOSIS — G89.4 CHRONIC PAIN SYNDROME: ICD-10-CM

## 2023-03-03 DIAGNOSIS — M54.41 CHRONIC MIDLINE LOW BACK PAIN WITH BILATERAL SCIATICA: ICD-10-CM

## 2023-03-03 DIAGNOSIS — M54.42 CHRONIC MIDLINE LOW BACK PAIN WITH BILATERAL SCIATICA: ICD-10-CM

## 2023-03-03 DIAGNOSIS — M79.7 FIBROMYALGIA: ICD-10-CM

## 2023-03-03 DIAGNOSIS — G89.29 CHRONIC MIDLINE LOW BACK PAIN WITH BILATERAL SCIATICA: ICD-10-CM

## 2023-03-03 DIAGNOSIS — Z51.81 MEDICATION MONITORING ENCOUNTER: ICD-10-CM

## 2023-03-03 PROCEDURE — 80307 DRUG TEST PRSMV CHEM ANLYZR: CPT | Performed by: CLINICAL MEDICAL LABORATORY

## 2023-03-03 RX ORDER — PREGABALIN 150 MG/1
150 CAPSULE ORAL 2 TIMES DAILY
Qty: 60 CAPSULE | Refills: 0 | Status: CANCELLED | OUTPATIENT
Start: 2023-03-03

## 2023-03-03 RX ORDER — PREGABALIN 150 MG/1
150 CAPSULE ORAL 2 TIMES DAILY
Qty: 60 CAPSULE | Refills: 0 | OUTPATIENT
Start: 2023-03-03

## 2023-03-04 LAB
AMPHETAMINES UR QL SCN>500 NG/ML: NEGATIVE
BARBITURATES UR QL SCN>200 NG/ML: NEGATIVE
BENZODIAZ UR QL SCN>200 NG/ML: POSITIVE
BZE UR QL SCN>150 NG/ML: NEGATIVE
CANNABINOIDS UR QL SCN>50 NG/ML: NEGATIVE
ETHANOL UR-MCNC: NEGATIVE MG/DL
METHADONE UR QL SCN>300 NG/ML: NEGATIVE
OPIATES UR QL SCN>300 NG/ML: NEGATIVE
PCP UR QL SCN>25 NG/ML: NEGATIVE

## 2023-03-07 ENCOUNTER — OFFICE VISIT (OUTPATIENT)
Dept: FAMILY MEDICINE | Age: 43
End: 2023-03-07

## 2023-03-07 VITALS
OXYGEN SATURATION: 97 % | SYSTOLIC BLOOD PRESSURE: 118 MMHG | TEMPERATURE: 98.4 F | DIASTOLIC BLOOD PRESSURE: 90 MMHG | RESPIRATION RATE: 20 BRPM | WEIGHT: 269.5 LBS | BODY MASS INDEX: 37.73 KG/M2 | HEART RATE: 101 BPM | HEIGHT: 71 IN

## 2023-03-07 DIAGNOSIS — G89.4 CHRONIC PAIN SYNDROME: ICD-10-CM

## 2023-03-07 DIAGNOSIS — M79.7 FIBROMYALGIA: ICD-10-CM

## 2023-03-07 DIAGNOSIS — G89.29 ABDOMINAL PAIN, CHRONIC, RIGHT UPPER QUADRANT: ICD-10-CM

## 2023-03-07 DIAGNOSIS — E03.8 SUBCLINICAL HYPOTHYROIDISM: ICD-10-CM

## 2023-03-07 DIAGNOSIS — G89.29 CHRONIC MIDLINE LOW BACK PAIN WITH BILATERAL SCIATICA: ICD-10-CM

## 2023-03-07 DIAGNOSIS — K57.30 DIVERTICULOSIS OF COLON: ICD-10-CM

## 2023-03-07 DIAGNOSIS — M54.42 CHRONIC MIDLINE LOW BACK PAIN WITH BILATERAL SCIATICA: ICD-10-CM

## 2023-03-07 DIAGNOSIS — Z79.899 HIGH RISK MEDICATION USE: ICD-10-CM

## 2023-03-07 DIAGNOSIS — E78.5 DYSLIPIDEMIA: ICD-10-CM

## 2023-03-07 DIAGNOSIS — M54.16 LUMBAR RADICULOPATHY: ICD-10-CM

## 2023-03-07 DIAGNOSIS — M54.41 CHRONIC MIDLINE LOW BACK PAIN WITH BILATERAL SCIATICA: ICD-10-CM

## 2023-03-07 DIAGNOSIS — E04.1 THYROID NODULE: ICD-10-CM

## 2023-03-07 DIAGNOSIS — Z79.01 CURRENT USE OF LONG TERM ANTICOAGULATION: ICD-10-CM

## 2023-03-07 DIAGNOSIS — R10.11 ABDOMINAL PAIN, CHRONIC, RIGHT UPPER QUADRANT: ICD-10-CM

## 2023-03-07 DIAGNOSIS — K52.9 CHRONIC DIARRHEA: ICD-10-CM

## 2023-03-07 DIAGNOSIS — E66.9 OBESITY (BMI 30-39.9): ICD-10-CM

## 2023-03-07 DIAGNOSIS — F41.8 DEPRESSION WITH ANXIETY: ICD-10-CM

## 2023-03-07 DIAGNOSIS — Z00.00 MEDICARE ANNUAL WELLNESS VISIT, SUBSEQUENT: Primary | ICD-10-CM

## 2023-03-07 DIAGNOSIS — K21.9 GASTROESOPHAGEAL REFLUX DISEASE WITHOUT ESOPHAGITIS: ICD-10-CM

## 2023-03-07 DIAGNOSIS — Z86.711 HISTORY OF PULMONARY EMBOLISM: ICD-10-CM

## 2023-03-07 DIAGNOSIS — F31.0 BIPOLAR AFFECTIVE DISORDER, CURRENT EPISODE HYPOMANIC (CMD): ICD-10-CM

## 2023-03-07 PROBLEM — E55.9 VITAMIN D DEFICIENCY: Status: RESOLVED | Noted: 2020-12-15 | Resolved: 2023-03-07

## 2023-03-07 PROBLEM — I26.99 PULMONARY EMBOLUS (CMD): Status: RESOLVED | Noted: 2019-02-28 | Resolved: 2023-03-07

## 2023-03-07 PROCEDURE — G0439 PPPS, SUBSEQ VISIT: HCPCS

## 2023-03-07 RX ORDER — PREGABALIN 150 MG/1
150 CAPSULE ORAL 2 TIMES DAILY
Qty: 60 CAPSULE | Refills: 0 | Status: SHIPPED | OUTPATIENT
Start: 2023-03-07 | End: 2023-04-04 | Stop reason: SDUPTHER

## 2023-03-07 ASSESSMENT — ANXIETY QUESTIONNAIRES
4. TROUBLE RELAXING: 1
2. NOT BEING ABLE TO STOP OR CONTROL WORRYING: 1
2. NOT BEING ABLE TO STOP OR CONTROL WORRYING: SEVERAL DAYS
1. FEELING NERVOUS, ANXIOUS, OR ON EDGE: NEARLY EVERY DAY
6. BECOMING EASILY ANNOYED OR IRRITABLE: 0
GAD7 TOTAL SCORE: 5
IF YOU CHECKED OFF ANY PROBLEMS ON THIS QUESTIONNAIRE, HOW DIFFICULT HAVE THESE PROBLEMS MADE IT FOR YOU TO DO YOUR WORK, TAKE CARE OF THINGS AT HOME, OR GET ALONG WITH OTHER PEOPLE: NOT DIFFICULT AT ALL
4. TROUBLE RELAXING: SEVERAL DAYS
6. BECOMING EASILY ANNOYED OR IRRITABLE: NOT AT ALL
5. BEING SO RESTLESS THAT IT IS HARD TO SIT STILL: 0
5. BEING SO RESTLESS THAT IT IS HARD TO SIT STILL: NOT AT ALL
3. WORRYING TOO MUCH ABOUT DIFFERENT THINGS: 0
3. WORRYING TOO MUCH ABOUT DIFFERENT THINGS: NOT AT ALL
1. FEELING NERVOUS, ANXIOUS, OR ON EDGE: 3
7. FEELING AFRAID AS IF SOMETHING AWFUL MIGHT HAPPEN: NOT AT ALL
7. FEELING AFRAID AS IF SOMETHING AWFUL MIGHT HAPPEN: 0

## 2023-03-07 ASSESSMENT — ENCOUNTER SYMPTOMS
DIARRHEA: 1
ABDOMINAL PAIN: 1
TINGLING: 1
BACK PAIN: 1

## 2023-03-27 RX ORDER — RIVAROXABAN 20 MG/1
TABLET, FILM COATED ORAL
Qty: 30 TABLET | Refills: 2 | Status: SHIPPED | OUTPATIENT
Start: 2023-03-27 | End: 2023-06-26

## 2023-03-28 DIAGNOSIS — J30.89 NON-SEASONAL ALLERGIC RHINITIS DUE TO OTHER ALLERGIC TRIGGER: ICD-10-CM

## 2023-03-29 DIAGNOSIS — K21.9 GASTROESOPHAGEAL REFLUX DISEASE, UNSPECIFIED WHETHER ESOPHAGITIS PRESENT: ICD-10-CM

## 2023-03-29 DIAGNOSIS — A60.01 HERPES SIMPLEX INFECTION OF PENIS: ICD-10-CM

## 2023-03-29 RX ORDER — VALACYCLOVIR HYDROCHLORIDE 500 MG/1
500 TABLET, FILM COATED ORAL DAILY
Qty: 90 TABLET | Refills: 0 | Status: SHIPPED | OUTPATIENT
Start: 2023-03-29 | End: 2023-06-26

## 2023-03-29 RX ORDER — HYDROXYZINE 50 MG/1
50 TABLET, FILM COATED ORAL NIGHTLY PRN
Qty: 90 TABLET | Refills: 0 | Status: SHIPPED | OUTPATIENT
Start: 2023-03-29 | End: 2023-04-18

## 2023-03-29 RX ORDER — PANTOPRAZOLE SODIUM 40 MG/1
40 TABLET, DELAYED RELEASE ORAL 2 TIMES DAILY
Qty: 180 TABLET | Refills: 3 | Status: SHIPPED | OUTPATIENT
Start: 2023-03-29 | End: 2023-05-16 | Stop reason: SDUPTHER

## 2023-04-04 DIAGNOSIS — G89.29 CHRONIC MIDLINE LOW BACK PAIN WITH BILATERAL SCIATICA: ICD-10-CM

## 2023-04-04 DIAGNOSIS — G89.4 CHRONIC PAIN SYNDROME: ICD-10-CM

## 2023-04-04 DIAGNOSIS — M54.42 CHRONIC MIDLINE LOW BACK PAIN WITH BILATERAL SCIATICA: ICD-10-CM

## 2023-04-04 DIAGNOSIS — M79.7 FIBROMYALGIA: ICD-10-CM

## 2023-04-04 DIAGNOSIS — M54.41 CHRONIC MIDLINE LOW BACK PAIN WITH BILATERAL SCIATICA: ICD-10-CM

## 2023-04-04 RX ORDER — PREGABALIN 150 MG/1
150 CAPSULE ORAL 2 TIMES DAILY
Qty: 60 CAPSULE | Refills: 0 | Status: SHIPPED | OUTPATIENT
Start: 2023-04-04 | End: 2023-05-01

## 2023-04-04 RX ORDER — PREGABALIN 150 MG/1
CAPSULE ORAL
Qty: 60 CAPSULE | Refills: 0 | OUTPATIENT
Start: 2023-04-04

## 2023-04-10 DIAGNOSIS — M79.7 FIBROMYALGIA: ICD-10-CM

## 2023-04-10 DIAGNOSIS — M54.42 CHRONIC MIDLINE LOW BACK PAIN WITH BILATERAL SCIATICA: ICD-10-CM

## 2023-04-10 DIAGNOSIS — G89.4 CHRONIC PAIN SYNDROME: ICD-10-CM

## 2023-04-10 DIAGNOSIS — G89.29 CHRONIC MIDLINE LOW BACK PAIN WITH BILATERAL SCIATICA: ICD-10-CM

## 2023-04-10 DIAGNOSIS — M54.41 CHRONIC MIDLINE LOW BACK PAIN WITH BILATERAL SCIATICA: ICD-10-CM

## 2023-04-10 DIAGNOSIS — F31.31 BIPOLAR AFFECTIVE DISORDER, CURRENTLY DEPRESSED, MILD (CMD): ICD-10-CM

## 2023-04-10 RX ORDER — METHOCARBAMOL 750 MG/1
750 TABLET, FILM COATED ORAL 3 TIMES DAILY
Qty: 90 TABLET | Refills: 5 | OUTPATIENT
Start: 2023-04-10

## 2023-04-10 RX ORDER — ALPRAZOLAM 1 MG/1
1 TABLET ORAL NIGHTLY PRN
Qty: 30 TABLET | Refills: 5 | OUTPATIENT
Start: 2023-04-10

## 2023-04-12 DIAGNOSIS — G89.4 CHRONIC PAIN SYNDROME: ICD-10-CM

## 2023-04-12 DIAGNOSIS — G89.29 CHRONIC MIDLINE LOW BACK PAIN WITH BILATERAL SCIATICA: ICD-10-CM

## 2023-04-12 DIAGNOSIS — M54.41 CHRONIC MIDLINE LOW BACK PAIN WITH BILATERAL SCIATICA: ICD-10-CM

## 2023-04-12 DIAGNOSIS — M54.42 CHRONIC MIDLINE LOW BACK PAIN WITH BILATERAL SCIATICA: ICD-10-CM

## 2023-04-12 DIAGNOSIS — M79.7 FIBROMYALGIA: ICD-10-CM

## 2023-04-12 RX ORDER — METHOCARBAMOL 750 MG/1
750 TABLET, FILM COATED ORAL 3 TIMES DAILY
Qty: 90 TABLET | Refills: 5 | OUTPATIENT
Start: 2023-04-12

## 2023-04-16 DIAGNOSIS — M54.42 CHRONIC MIDLINE LOW BACK PAIN WITH BILATERAL SCIATICA: ICD-10-CM

## 2023-04-16 DIAGNOSIS — G89.29 CHRONIC MIDLINE LOW BACK PAIN WITH BILATERAL SCIATICA: ICD-10-CM

## 2023-04-16 DIAGNOSIS — M79.7 FIBROMYALGIA: ICD-10-CM

## 2023-04-16 DIAGNOSIS — M54.41 CHRONIC MIDLINE LOW BACK PAIN WITH BILATERAL SCIATICA: ICD-10-CM

## 2023-04-16 DIAGNOSIS — G89.4 CHRONIC PAIN SYNDROME: ICD-10-CM

## 2023-04-17 DIAGNOSIS — M79.7 FIBROMYALGIA: ICD-10-CM

## 2023-04-17 DIAGNOSIS — M54.41 CHRONIC MIDLINE LOW BACK PAIN WITH BILATERAL SCIATICA: ICD-10-CM

## 2023-04-17 DIAGNOSIS — M54.42 CHRONIC MIDLINE LOW BACK PAIN WITH BILATERAL SCIATICA: ICD-10-CM

## 2023-04-17 DIAGNOSIS — G89.4 CHRONIC PAIN SYNDROME: ICD-10-CM

## 2023-04-17 DIAGNOSIS — G89.29 CHRONIC MIDLINE LOW BACK PAIN WITH BILATERAL SCIATICA: ICD-10-CM

## 2023-04-17 RX ORDER — METHOCARBAMOL 750 MG/1
750 TABLET, FILM COATED ORAL 3 TIMES DAILY
Qty: 90 TABLET | Refills: 5 | OUTPATIENT
Start: 2023-04-17

## 2023-04-18 ENCOUNTER — OFFICE VISIT (OUTPATIENT)
Dept: OTOLARYNGOLOGY | Age: 43
End: 2023-04-18

## 2023-04-18 DIAGNOSIS — J30.89 NON-SEASONAL ALLERGIC RHINITIS DUE TO OTHER ALLERGIC TRIGGER: ICD-10-CM

## 2023-04-18 DIAGNOSIS — M26.609 TMJ (TEMPOROMANDIBULAR JOINT DISORDER): ICD-10-CM

## 2023-04-18 DIAGNOSIS — H91.91 HEARING LOSS OF RIGHT EAR, UNSPECIFIED HEARING LOSS TYPE: Primary | ICD-10-CM

## 2023-04-18 DIAGNOSIS — J30.2 SEASONAL ALLERGIES: ICD-10-CM

## 2023-04-18 PROCEDURE — 99214 OFFICE O/P EST MOD 30 MIN: CPT | Performed by: OTOLARYNGOLOGY

## 2023-04-18 PROCEDURE — 92504 EAR MICROSCOPY EXAMINATION: CPT | Performed by: OTOLARYNGOLOGY

## 2023-04-18 RX ORDER — METHOCARBAMOL 750 MG/1
750 TABLET, FILM COATED ORAL 3 TIMES DAILY
Qty: 90 TABLET | Refills: 5 | Status: SHIPPED | OUTPATIENT
Start: 2023-04-18 | End: 2023-10-30

## 2023-04-18 RX ORDER — HYDROXYZINE 50 MG/1
50 TABLET, FILM COATED ORAL NIGHTLY PRN
Qty: 90 TABLET | Refills: 0 | Status: SHIPPED | OUTPATIENT
Start: 2023-04-18 | End: 2023-08-24 | Stop reason: SDUPTHER

## 2023-05-01 DIAGNOSIS — M79.7 FIBROMYALGIA: ICD-10-CM

## 2023-05-01 DIAGNOSIS — G89.29 CHRONIC MIDLINE LOW BACK PAIN WITH BILATERAL SCIATICA: ICD-10-CM

## 2023-05-01 DIAGNOSIS — M54.41 CHRONIC MIDLINE LOW BACK PAIN WITH BILATERAL SCIATICA: ICD-10-CM

## 2023-05-01 DIAGNOSIS — M54.42 CHRONIC MIDLINE LOW BACK PAIN WITH BILATERAL SCIATICA: ICD-10-CM

## 2023-05-01 DIAGNOSIS — G89.4 CHRONIC PAIN SYNDROME: ICD-10-CM

## 2023-05-01 RX ORDER — PREGABALIN 150 MG/1
150 CAPSULE ORAL 2 TIMES DAILY
Qty: 60 CAPSULE | Refills: 0 | OUTPATIENT
Start: 2023-05-01

## 2023-05-01 RX ORDER — PREGABALIN 150 MG/1
CAPSULE ORAL
Qty: 60 CAPSULE | Refills: 0 | Status: SHIPPED | OUTPATIENT
Start: 2023-05-01 | End: 2023-06-05 | Stop reason: SDUPTHER

## 2023-05-16 ENCOUNTER — OFFICE VISIT (OUTPATIENT)
Dept: GASTROENTEROLOGY | Age: 43
End: 2023-05-16

## 2023-05-16 VITALS — HEART RATE: 80 BPM | SYSTOLIC BLOOD PRESSURE: 120 MMHG | OXYGEN SATURATION: 95 % | DIASTOLIC BLOOD PRESSURE: 88 MMHG

## 2023-05-16 DIAGNOSIS — K57.30 DIVERTICULOSIS OF COLON: ICD-10-CM

## 2023-05-16 DIAGNOSIS — K21.9 GASTROESOPHAGEAL REFLUX DISEASE WITHOUT ESOPHAGITIS: Primary | ICD-10-CM

## 2023-05-16 DIAGNOSIS — K21.9 GASTROESOPHAGEAL REFLUX DISEASE, UNSPECIFIED WHETHER ESOPHAGITIS PRESENT: ICD-10-CM

## 2023-05-16 PROCEDURE — 99213 OFFICE O/P EST LOW 20 MIN: CPT | Performed by: INTERNAL MEDICINE

## 2023-05-16 RX ORDER — PANTOPRAZOLE SODIUM 40 MG/1
40 TABLET, DELAYED RELEASE ORAL 2 TIMES DAILY
Qty: 180 TABLET | Refills: 11 | Status: SHIPPED | OUTPATIENT
Start: 2023-05-16

## 2023-05-23 ENCOUNTER — OFFICE VISIT (OUTPATIENT)
Dept: OTOLARYNGOLOGY | Age: 43
End: 2023-05-23

## 2023-05-23 ENCOUNTER — OFFICE VISIT (OUTPATIENT)
Dept: AUDIOLOGY | Age: 43
End: 2023-05-23

## 2023-05-23 DIAGNOSIS — H91.8X3 ASYMMETRICAL HEARING LOSS: ICD-10-CM

## 2023-05-23 DIAGNOSIS — H93.13 SUBJECTIVE TINNITUS OF BOTH EARS: ICD-10-CM

## 2023-05-23 DIAGNOSIS — H91.91 HEARING PROBLEM OF RIGHT EAR: Primary | ICD-10-CM

## 2023-05-23 DIAGNOSIS — H90.5 HEARING LOSS, SENSORINEURAL, HIGH FREQUENCY: Primary | ICD-10-CM

## 2023-05-23 DIAGNOSIS — H90.3 SENSORINEURAL HEARING LOSS (SNHL) OF BOTH EARS: ICD-10-CM

## 2023-05-23 PROCEDURE — 92557 COMPREHENSIVE HEARING TEST: CPT | Performed by: AUDIOLOGIST

## 2023-05-23 PROCEDURE — 99214 OFFICE O/P EST MOD 30 MIN: CPT | Performed by: OTOLARYNGOLOGY

## 2023-06-03 DIAGNOSIS — G89.4 CHRONIC PAIN SYNDROME: ICD-10-CM

## 2023-06-03 DIAGNOSIS — M54.41 CHRONIC MIDLINE LOW BACK PAIN WITH BILATERAL SCIATICA: ICD-10-CM

## 2023-06-03 DIAGNOSIS — M79.7 FIBROMYALGIA: ICD-10-CM

## 2023-06-03 DIAGNOSIS — G89.29 CHRONIC MIDLINE LOW BACK PAIN WITH BILATERAL SCIATICA: ICD-10-CM

## 2023-06-03 DIAGNOSIS — A60.01 HERPES SIMPLEX INFECTION OF PENIS: ICD-10-CM

## 2023-06-03 DIAGNOSIS — M54.42 CHRONIC MIDLINE LOW BACK PAIN WITH BILATERAL SCIATICA: ICD-10-CM

## 2023-06-05 DIAGNOSIS — M79.7 FIBROMYALGIA: ICD-10-CM

## 2023-06-05 DIAGNOSIS — G89.4 CHRONIC PAIN SYNDROME: ICD-10-CM

## 2023-06-05 DIAGNOSIS — M54.41 CHRONIC MIDLINE LOW BACK PAIN WITH BILATERAL SCIATICA: ICD-10-CM

## 2023-06-05 DIAGNOSIS — G89.29 CHRONIC MIDLINE LOW BACK PAIN WITH BILATERAL SCIATICA: ICD-10-CM

## 2023-06-05 DIAGNOSIS — M54.42 CHRONIC MIDLINE LOW BACK PAIN WITH BILATERAL SCIATICA: ICD-10-CM

## 2023-06-05 RX ORDER — PREGABALIN 150 MG/1
CAPSULE ORAL
Qty: 60 CAPSULE | Refills: 0 | OUTPATIENT
Start: 2023-06-05

## 2023-06-05 RX ORDER — VALACYCLOVIR HYDROCHLORIDE 500 MG/1
TABLET, FILM COATED ORAL
Qty: 90 TABLET | Refills: 0 | OUTPATIENT
Start: 2023-06-05

## 2023-06-06 RX ORDER — PREGABALIN 200 MG/1
200 CAPSULE ORAL 2 TIMES DAILY
Qty: 180 CAPSULE | Refills: 3 | OUTPATIENT
Start: 2023-06-06

## 2023-06-06 RX ORDER — PREGABALIN 150 MG/1
CAPSULE ORAL
Qty: 60 CAPSULE | Refills: 0 | OUTPATIENT
Start: 2023-06-06

## 2023-06-06 RX ORDER — PREGABALIN 150 MG/1
150 CAPSULE ORAL 2 TIMES DAILY
Qty: 60 CAPSULE | Refills: 0 | Status: SHIPPED | OUTPATIENT
Start: 2023-06-06 | End: 2023-06-27 | Stop reason: SDUPTHER

## 2023-06-06 RX ORDER — PREGABALIN 150 MG/1
150 CAPSULE ORAL DAILY
Qty: 90 CAPSULE | Refills: 0 | Status: CANCELLED | OUTPATIENT
Start: 2023-06-06

## 2023-06-24 DIAGNOSIS — A60.01 HERPES SIMPLEX INFECTION OF PENIS: ICD-10-CM

## 2023-06-26 RX ORDER — VALACYCLOVIR HYDROCHLORIDE 500 MG/1
TABLET, FILM COATED ORAL
Qty: 90 TABLET | Refills: 0 | Status: SHIPPED | OUTPATIENT
Start: 2023-06-26 | End: 2023-08-03

## 2023-06-26 RX ORDER — RIVAROXABAN 20 MG/1
TABLET, FILM COATED ORAL
Qty: 30 TABLET | Refills: 2 | Status: SHIPPED | OUTPATIENT
Start: 2023-06-26 | End: 2023-09-29

## 2023-06-27 DIAGNOSIS — M54.41 CHRONIC MIDLINE LOW BACK PAIN WITH BILATERAL SCIATICA: ICD-10-CM

## 2023-06-27 DIAGNOSIS — G89.4 CHRONIC PAIN SYNDROME: ICD-10-CM

## 2023-06-27 DIAGNOSIS — M54.42 CHRONIC MIDLINE LOW BACK PAIN WITH BILATERAL SCIATICA: ICD-10-CM

## 2023-06-27 DIAGNOSIS — G89.29 CHRONIC MIDLINE LOW BACK PAIN WITH BILATERAL SCIATICA: ICD-10-CM

## 2023-06-27 DIAGNOSIS — M79.7 FIBROMYALGIA: ICD-10-CM

## 2023-06-27 RX ORDER — PREGABALIN 150 MG/1
150 CAPSULE ORAL 2 TIMES DAILY
Qty: 60 CAPSULE | Refills: 0 | Status: SHIPPED | OUTPATIENT
Start: 2023-07-03 | End: 2023-06-29 | Stop reason: SDUPTHER

## 2023-06-29 ENCOUNTER — OFFICE VISIT (OUTPATIENT)
Dept: FAMILY MEDICINE | Age: 43
End: 2023-06-29

## 2023-06-29 VITALS
SYSTOLIC BLOOD PRESSURE: 130 MMHG | WEIGHT: 272.7 LBS | TEMPERATURE: 98 F | HEART RATE: 89 BPM | OXYGEN SATURATION: 100 % | BODY MASS INDEX: 37.77 KG/M2 | DIASTOLIC BLOOD PRESSURE: 88 MMHG

## 2023-06-29 DIAGNOSIS — M54.42 CHRONIC MIDLINE LOW BACK PAIN WITH BILATERAL SCIATICA: ICD-10-CM

## 2023-06-29 DIAGNOSIS — R22.9 LOCALIZED SKIN MASS, LUMP, OR SWELLING: Primary | ICD-10-CM

## 2023-06-29 DIAGNOSIS — G89.29 CHRONIC MIDLINE LOW BACK PAIN WITH BILATERAL SCIATICA: ICD-10-CM

## 2023-06-29 DIAGNOSIS — G89.4 CHRONIC PAIN SYNDROME: ICD-10-CM

## 2023-06-29 DIAGNOSIS — M54.41 CHRONIC MIDLINE LOW BACK PAIN WITH BILATERAL SCIATICA: ICD-10-CM

## 2023-06-29 DIAGNOSIS — M79.7 FIBROMYALGIA: ICD-10-CM

## 2023-06-29 PROCEDURE — 99212 OFFICE O/P EST SF 10 MIN: CPT

## 2023-06-29 RX ORDER — PREGABALIN 150 MG/1
150 CAPSULE ORAL 2 TIMES DAILY
Qty: 60 CAPSULE | Refills: 2 | Status: SHIPPED | OUTPATIENT
Start: 2023-07-03 | End: 2023-11-03 | Stop reason: SDUPTHER

## 2023-07-13 ENCOUNTER — HOSPITAL ENCOUNTER (OUTPATIENT)
Dept: ULTRASOUND IMAGING | Age: 43
Discharge: HOME | End: 2023-07-13

## 2023-07-13 DIAGNOSIS — R22.9 LOCALIZED SKIN MASS, LUMP, OR SWELLING: ICD-10-CM

## 2023-07-13 PROCEDURE — 76536 US EXAM OF HEAD AND NECK: CPT | Performed by: RADIOLOGY

## 2023-07-13 PROCEDURE — 76536 US EXAM OF HEAD AND NECK: CPT

## 2023-07-24 ENCOUNTER — TELEPHONE (OUTPATIENT)
Dept: FAMILY MEDICINE | Age: 43
End: 2023-07-24

## 2023-07-24 DIAGNOSIS — R22.1 MASS IN NECK: Primary | ICD-10-CM

## 2023-08-02 DIAGNOSIS — A60.01 HERPES SIMPLEX INFECTION OF PENIS: ICD-10-CM

## 2023-08-03 ENCOUNTER — HOSPITAL ENCOUNTER (OUTPATIENT)
Dept: MRI IMAGING | Age: 43
Discharge: HOME | End: 2023-08-03

## 2023-08-03 DIAGNOSIS — R22.1 MASS IN NECK: ICD-10-CM

## 2023-08-03 PROCEDURE — 10002805 HB CONTRAST AGENT: Performed by: STUDENT IN AN ORGANIZED HEALTH CARE EDUCATION/TRAINING PROGRAM

## 2023-08-03 PROCEDURE — 70543 MRI ORBT/FAC/NCK W/O &W/DYE: CPT

## 2023-08-03 PROCEDURE — A9585 GADOBUTROL INJECTION: HCPCS | Performed by: STUDENT IN AN ORGANIZED HEALTH CARE EDUCATION/TRAINING PROGRAM

## 2023-08-03 PROCEDURE — G1004 CDSM NDSC: HCPCS | Performed by: RADIOLOGY

## 2023-08-03 PROCEDURE — G1004 CDSM NDSC: HCPCS

## 2023-08-03 PROCEDURE — 70543 MRI ORBT/FAC/NCK W/O &W/DYE: CPT | Performed by: RADIOLOGY

## 2023-08-03 RX ORDER — GADOBUTROL 604.72 MG/ML
12 INJECTION INTRAVENOUS ONCE
Status: COMPLETED | OUTPATIENT
Start: 2023-08-03 | End: 2023-08-03

## 2023-08-03 RX ORDER — VALACYCLOVIR HYDROCHLORIDE 500 MG/1
TABLET, FILM COATED ORAL
Qty: 90 TABLET | Refills: 0 | Status: SHIPPED | OUTPATIENT
Start: 2023-08-03 | End: 2023-11-06

## 2023-08-03 RX ADMIN — GADOBUTROL 12 ML: 604.72 INJECTION INTRAVENOUS at 09:58

## 2023-08-11 ENCOUNTER — E-ADVICE (OUTPATIENT)
Dept: FAMILY MEDICINE | Age: 43
End: 2023-08-11

## 2023-08-16 ENCOUNTER — TELEPHONE (OUTPATIENT)
Dept: FAMILY MEDICINE | Age: 43
End: 2023-08-16

## 2023-08-16 DIAGNOSIS — R22.9 LOCALIZED SKIN MASS, LUMP, OR SWELLING: Primary | ICD-10-CM

## 2023-08-22 ENCOUNTER — LAB SERVICES (OUTPATIENT)
Dept: LAB | Age: 43
End: 2023-08-22

## 2023-08-22 DIAGNOSIS — Z79.899 MEDICATION MANAGEMENT: ICD-10-CM

## 2023-08-22 DIAGNOSIS — F31.31 BIPOLAR AFFECTIVE DISORDER, CURRENTLY DEPRESSED, MILD (CMD): ICD-10-CM

## 2023-08-22 LAB — VALPROATE SERPL-MCNC: 63 MCG/ML (ref 50–125)

## 2023-08-22 PROCEDURE — 36415 COLL VENOUS BLD VENIPUNCTURE: CPT | Performed by: INTERNAL MEDICINE

## 2023-08-22 PROCEDURE — 80164 ASSAY DIPROPYLACETIC ACD TOT: CPT | Performed by: CLINICAL MEDICAL LABORATORY

## 2023-08-23 ENCOUNTER — OFFICE VISIT (OUTPATIENT)
Dept: SURGERY | Age: 43
End: 2023-08-23

## 2023-08-23 VITALS
SYSTOLIC BLOOD PRESSURE: 127 MMHG | DIASTOLIC BLOOD PRESSURE: 89 MMHG | RESPIRATION RATE: 16 BRPM | HEART RATE: 80 BPM | OXYGEN SATURATION: 97 %

## 2023-08-23 DIAGNOSIS — J30.89 NON-SEASONAL ALLERGIC RHINITIS DUE TO OTHER ALLERGIC TRIGGER: ICD-10-CM

## 2023-08-23 DIAGNOSIS — M79.89 SOFT TISSUE MASS: Primary | ICD-10-CM

## 2023-08-23 PROCEDURE — 99213 OFFICE O/P EST LOW 20 MIN: CPT | Performed by: SURGERY

## 2023-08-23 RX ORDER — ALPRAZOLAM 1 MG/1
1 TABLET ORAL NIGHTLY PRN
Qty: 30 TABLET | Refills: 5 | Status: SHIPPED | OUTPATIENT
Start: 2023-08-23 | End: 2023-09-05 | Stop reason: SDUPTHER

## 2023-08-24 RX ORDER — HYDROXYZINE 50 MG/1
50 TABLET, FILM COATED ORAL NIGHTLY PRN
Qty: 90 TABLET | Refills: 2 | Status: SHIPPED | OUTPATIENT
Start: 2023-08-24

## 2023-08-27 ENCOUNTER — TELEPHONE (OUTPATIENT)
Dept: PSYCHIATRY | Age: 43
End: 2023-08-27

## 2023-08-27 DIAGNOSIS — F31.31 BIPOLAR AFFECTIVE DISORDER, CURRENTLY DEPRESSED, MILD (CMD): ICD-10-CM

## 2023-08-28 RX ORDER — DIVALPROEX SODIUM 500 MG/1
TABLET, EXTENDED RELEASE ORAL
Qty: 180 TABLET | Refills: 0 | OUTPATIENT
Start: 2023-08-28

## 2023-09-05 ENCOUNTER — BEHAVIORAL HEALTH (OUTPATIENT)
Dept: PSYCHIATRY | Age: 43
End: 2023-09-05

## 2023-09-05 DIAGNOSIS — F31.76 BIPOLAR DISORDER, IN FULL REMISSION, MOST RECENT EPISODE DEPRESSED (CMD): Primary | ICD-10-CM

## 2023-09-05 PROCEDURE — 99214 OFFICE O/P EST MOD 30 MIN: CPT | Performed by: NURSE PRACTITIONER

## 2023-09-05 RX ORDER — QUETIAPINE FUMARATE 100 MG/1
200-300 TABLET, FILM COATED ORAL NIGHTLY
Qty: 270 TABLET | Refills: 1 | Status: SHIPPED | OUTPATIENT
Start: 2023-09-05

## 2023-09-05 RX ORDER — DIVALPROEX SODIUM 500 MG/1
1000 TABLET, EXTENDED RELEASE ORAL AT BEDTIME
Qty: 180 TABLET | Refills: 1 | Status: SHIPPED | OUTPATIENT
Start: 2023-09-05

## 2023-09-05 RX ORDER — VILAZODONE HYDROCHLORIDE 40 MG/1
40 TABLET ORAL DAILY
Qty: 90 TABLET | Refills: 1 | Status: SHIPPED | OUTPATIENT
Start: 2023-09-05

## 2023-09-05 RX ORDER — ALPRAZOLAM 1 MG/1
1 TABLET ORAL NIGHTLY PRN
Qty: 30 TABLET | Refills: 5 | Status: SHIPPED | OUTPATIENT
Start: 2023-09-21

## 2023-09-12 ENCOUNTER — NURSE TRIAGE (OUTPATIENT)
Dept: FAMILY MEDICINE | Age: 43
End: 2023-09-12

## 2023-09-26 ENCOUNTER — OFFICE VISIT (OUTPATIENT)
Dept: FAMILY MEDICINE | Age: 43
End: 2023-09-26

## 2023-09-26 VITALS
DIASTOLIC BLOOD PRESSURE: 86 MMHG | BODY MASS INDEX: 37.5 KG/M2 | WEIGHT: 270.8 LBS | SYSTOLIC BLOOD PRESSURE: 110 MMHG | TEMPERATURE: 98.3 F | HEART RATE: 76 BPM

## 2023-09-26 DIAGNOSIS — M79.671 FOOT PAIN, BILATERAL: ICD-10-CM

## 2023-09-26 DIAGNOSIS — M79.672 FOOT PAIN, BILATERAL: ICD-10-CM

## 2023-09-26 DIAGNOSIS — R20.0 NUMBNESS OF FEET: Primary | ICD-10-CM

## 2023-09-26 PROCEDURE — 99213 OFFICE O/P EST LOW 20 MIN: CPT

## 2023-09-28 ENCOUNTER — LAB SERVICES (OUTPATIENT)
Dept: LAB | Age: 43
End: 2023-09-28

## 2023-09-28 DIAGNOSIS — R20.0 NUMBNESS OF FEET: ICD-10-CM

## 2023-09-28 LAB
FOLATE SERPL-MCNC: 4.3 NG/ML
HBA1C MFR BLD: 5.4 % (ref 4.5–5.6)
MAGNESIUM SERPL-MCNC: 1.8 MG/DL (ref 1.7–2.4)
VIT B12 SERPL-MCNC: 1151 PG/ML (ref 211–911)

## 2023-09-28 PROCEDURE — 36415 COLL VENOUS BLD VENIPUNCTURE: CPT | Performed by: INTERNAL MEDICINE

## 2023-09-28 PROCEDURE — 83036 HEMOGLOBIN GLYCOSYLATED A1C: CPT | Performed by: CLINICAL MEDICAL LABORATORY

## 2023-09-28 PROCEDURE — 82746 ASSAY OF FOLIC ACID SERUM: CPT | Performed by: CLINICAL MEDICAL LABORATORY

## 2023-09-28 PROCEDURE — 82607 VITAMIN B-12: CPT | Performed by: CLINICAL MEDICAL LABORATORY

## 2023-09-28 PROCEDURE — 83735 ASSAY OF MAGNESIUM: CPT | Performed by: CLINICAL MEDICAL LABORATORY

## 2023-09-29 ENCOUNTER — E-ADVICE (OUTPATIENT)
Dept: FAMILY MEDICINE | Age: 43
End: 2023-09-29

## 2023-09-29 RX ORDER — RIVAROXABAN 20 MG/1
TABLET, FILM COATED ORAL
Qty: 30 TABLET | Refills: 2 | Status: SHIPPED | OUTPATIENT
Start: 2023-09-29

## 2023-10-19 ENCOUNTER — NURSE TRIAGE (OUTPATIENT)
Dept: FAMILY MEDICINE | Age: 43
End: 2023-10-19

## 2023-10-25 ENCOUNTER — OFFICE VISIT (OUTPATIENT)
Dept: FAMILY MEDICINE | Age: 43
End: 2023-10-25

## 2023-10-25 VITALS
HEART RATE: 100 BPM | TEMPERATURE: 97.7 F | WEIGHT: 268.3 LBS | BODY MASS INDEX: 37.16 KG/M2 | DIASTOLIC BLOOD PRESSURE: 86 MMHG | SYSTOLIC BLOOD PRESSURE: 136 MMHG

## 2023-10-25 DIAGNOSIS — Z23 NEED FOR VACCINATION: ICD-10-CM

## 2023-10-25 DIAGNOSIS — G89.29 CHRONIC MIDLINE LOW BACK PAIN WITH LEFT-SIDED SCIATICA: Primary | ICD-10-CM

## 2023-10-25 DIAGNOSIS — M54.42 CHRONIC MIDLINE LOW BACK PAIN WITH LEFT-SIDED SCIATICA: Primary | ICD-10-CM

## 2023-10-25 PROCEDURE — 90686 IIV4 VACC NO PRSV 0.5 ML IM: CPT

## 2023-10-25 PROCEDURE — G0008 ADMIN INFLUENZA VIRUS VAC: HCPCS

## 2023-10-25 PROCEDURE — 99213 OFFICE O/P EST LOW 20 MIN: CPT

## 2023-10-25 RX ORDER — PREDNISONE 20 MG/1
20 TABLET ORAL 2 TIMES DAILY
Qty: 10 TABLET | Refills: 0 | Status: SHIPPED | OUTPATIENT
Start: 2023-10-25

## 2023-10-25 RX ORDER — LIDOCAINE 50 MG/G
1 PATCH TOPICAL EVERY 24 HOURS
Qty: 7 PATCH | Refills: 1 | Status: SHIPPED | OUTPATIENT
Start: 2023-10-25

## 2023-10-26 ENCOUNTER — TELEPHONE (OUTPATIENT)
Dept: PSYCHIATRY | Age: 43
End: 2023-10-26

## 2023-10-29 DIAGNOSIS — M54.41 CHRONIC MIDLINE LOW BACK PAIN WITH BILATERAL SCIATICA: ICD-10-CM

## 2023-10-29 DIAGNOSIS — M54.42 CHRONIC MIDLINE LOW BACK PAIN WITH BILATERAL SCIATICA: ICD-10-CM

## 2023-10-29 DIAGNOSIS — G89.29 CHRONIC MIDLINE LOW BACK PAIN WITH BILATERAL SCIATICA: ICD-10-CM

## 2023-10-29 DIAGNOSIS — G89.4 CHRONIC PAIN SYNDROME: ICD-10-CM

## 2023-10-29 DIAGNOSIS — M79.7 FIBROMYALGIA: ICD-10-CM

## 2023-10-30 RX ORDER — METHOCARBAMOL 750 MG/1
TABLET, FILM COATED ORAL
Qty: 90 TABLET | Refills: 5 | Status: SHIPPED | OUTPATIENT
Start: 2023-10-30

## 2023-10-31 DIAGNOSIS — M54.42 CHRONIC MIDLINE LOW BACK PAIN WITH BILATERAL SCIATICA: ICD-10-CM

## 2023-10-31 DIAGNOSIS — G89.29 CHRONIC MIDLINE LOW BACK PAIN WITH BILATERAL SCIATICA: ICD-10-CM

## 2023-10-31 DIAGNOSIS — M54.41 CHRONIC MIDLINE LOW BACK PAIN WITH BILATERAL SCIATICA: ICD-10-CM

## 2023-10-31 DIAGNOSIS — M79.7 FIBROMYALGIA: ICD-10-CM

## 2023-10-31 DIAGNOSIS — G89.4 CHRONIC PAIN SYNDROME: ICD-10-CM

## 2023-11-01 ENCOUNTER — IMAGING SERVICES (OUTPATIENT)
Dept: MRI IMAGING | Age: 43
End: 2023-11-01

## 2023-11-01 ENCOUNTER — APPOINTMENT (OUTPATIENT)
Dept: FAMILY MEDICINE | Age: 43
End: 2023-11-01

## 2023-11-01 DIAGNOSIS — M54.42 CHRONIC MIDLINE LOW BACK PAIN WITH LEFT-SIDED SCIATICA: ICD-10-CM

## 2023-11-01 DIAGNOSIS — G89.29 CHRONIC MIDLINE LOW BACK PAIN WITH LEFT-SIDED SCIATICA: ICD-10-CM

## 2023-11-01 PROCEDURE — 72148 MRI LUMBAR SPINE W/O DYE: CPT | Performed by: RADIOLOGY

## 2023-11-03 ENCOUNTER — TELEPHONE (OUTPATIENT)
Dept: FAMILY MEDICINE | Age: 43
End: 2023-11-03

## 2023-11-03 DIAGNOSIS — M54.41 CHRONIC MIDLINE LOW BACK PAIN WITH BILATERAL SCIATICA: ICD-10-CM

## 2023-11-03 DIAGNOSIS — M79.7 FIBROMYALGIA: ICD-10-CM

## 2023-11-03 DIAGNOSIS — G89.29 CHRONIC MIDLINE LOW BACK PAIN WITH BILATERAL SCIATICA: ICD-10-CM

## 2023-11-03 DIAGNOSIS — M54.42 CHRONIC MIDLINE LOW BACK PAIN WITH BILATERAL SCIATICA: ICD-10-CM

## 2023-11-03 DIAGNOSIS — G89.4 CHRONIC PAIN SYNDROME: ICD-10-CM

## 2023-11-03 RX ORDER — PREGABALIN 150 MG/1
150 CAPSULE ORAL 2 TIMES DAILY
Qty: 60 CAPSULE | Refills: 2 | Status: SHIPPED | OUTPATIENT
Start: 2023-11-03

## 2023-11-04 DIAGNOSIS — A60.01 HERPES SIMPLEX INFECTION OF PENIS: ICD-10-CM

## 2023-11-04 DIAGNOSIS — F31.0 BIPOLAR DISORDER, CURRENT EPISODE HYPOMANIC (CMD): ICD-10-CM

## 2023-11-06 RX ORDER — PREGABALIN 150 MG/1
CAPSULE ORAL
Qty: 60 CAPSULE | Refills: 0 | OUTPATIENT
Start: 2023-11-06

## 2023-11-06 RX ORDER — VALACYCLOVIR HYDROCHLORIDE 500 MG/1
TABLET, FILM COATED ORAL
Qty: 90 TABLET | Refills: 0 | Status: SHIPPED | OUTPATIENT
Start: 2023-11-06

## 2023-11-06 RX ORDER — QUETIAPINE FUMARATE 200 MG/1
200-300 TABLET, FILM COATED ORAL NIGHTLY
Qty: 135 TABLET | Refills: 1 | Status: SHIPPED | OUTPATIENT
Start: 2023-11-06

## 2023-11-08 ENCOUNTER — MED INFO FORMS (OUTPATIENT)
Dept: HEALTH INFORMATION MANAGEMENT | Age: 43
End: 2023-11-08

## 2023-11-08 ENCOUNTER — E-ADVICE (OUTPATIENT)
Dept: FAMILY MEDICINE | Age: 43
End: 2023-11-08

## 2023-11-08 DIAGNOSIS — M54.42 CHRONIC MIDLINE LOW BACK PAIN WITH BILATERAL SCIATICA: Primary | ICD-10-CM

## 2023-11-08 DIAGNOSIS — G89.29 CHRONIC MIDLINE LOW BACK PAIN WITH BILATERAL SCIATICA: Primary | ICD-10-CM

## 2023-11-08 DIAGNOSIS — M54.41 CHRONIC MIDLINE LOW BACK PAIN WITH BILATERAL SCIATICA: Primary | ICD-10-CM

## 2023-11-10 ENCOUNTER — TELEPHONE (OUTPATIENT)
Dept: FAMILY MEDICINE | Age: 43
End: 2023-11-10

## 2023-11-10 DIAGNOSIS — M43.06 LUMBAR SPONDYLOLYSIS: Primary | ICD-10-CM

## 2023-11-20 ENCOUNTER — TELEPHONE (OUTPATIENT)
Dept: FAMILY MEDICINE | Age: 43
End: 2023-11-20

## 2023-11-22 ENCOUNTER — APPOINTMENT (OUTPATIENT)
Dept: NEUROLOGY | Age: 43
End: 2023-11-22

## 2023-12-01 ENCOUNTER — OFFICE VISIT (OUTPATIENT)
Dept: NEUROLOGY | Age: 43
End: 2023-12-01

## 2023-12-01 DIAGNOSIS — M79.672 FOOT PAIN, BILATERAL: ICD-10-CM

## 2023-12-01 DIAGNOSIS — R20.0 NUMBNESS OF FEET: Primary | ICD-10-CM

## 2023-12-01 DIAGNOSIS — M79.671 FOOT PAIN, BILATERAL: ICD-10-CM

## 2023-12-01 PROCEDURE — 95885 MUSC TST DONE W/NERV TST LIM: CPT | Performed by: PSYCHIATRY & NEUROLOGY

## 2023-12-01 PROCEDURE — 95909 NRV CNDJ TST 5-6 STUDIES: CPT | Performed by: PSYCHIATRY & NEUROLOGY

## 2023-12-01 PROCEDURE — 95886 MUSC TEST DONE W/N TEST COMP: CPT | Performed by: PSYCHIATRY & NEUROLOGY

## 2023-12-04 ENCOUNTER — APPOINTMENT (OUTPATIENT)
Dept: PSYCHIATRY | Age: 43
End: 2023-12-04

## 2023-12-04 ENCOUNTER — TELEPHONE (OUTPATIENT)
Dept: FAMILY MEDICINE | Age: 43
End: 2023-12-04

## 2023-12-04 ENCOUNTER — APPOINTMENT (OUTPATIENT)
Dept: ORTHOPEDICS | Age: 43
End: 2023-12-04

## 2023-12-04 DIAGNOSIS — F31.76 BIPOLAR DISORDER, IN FULL REMISSION, MOST RECENT EPISODE DEPRESSED (CMD): Primary | ICD-10-CM

## 2023-12-04 PROCEDURE — 99214 OFFICE O/P EST MOD 30 MIN: CPT | Performed by: NURSE PRACTITIONER

## 2023-12-04 RX ORDER — DIVALPROEX SODIUM 500 MG/1
1000 TABLET, EXTENDED RELEASE ORAL AT BEDTIME
Qty: 180 TABLET | Refills: 3 | Status: SHIPPED | OUTPATIENT
Start: 2023-12-04

## 2023-12-04 RX ORDER — VILAZODONE HYDROCHLORIDE 40 MG/1
40 TABLET ORAL DAILY
Qty: 90 TABLET | Refills: 3 | Status: SHIPPED | OUTPATIENT
Start: 2023-12-04

## 2023-12-04 RX ORDER — ALPRAZOLAM 1 MG/1
1 TABLET ORAL NIGHTLY PRN
Qty: 30 TABLET | Refills: 5 | Status: SHIPPED | OUTPATIENT
Start: 2023-12-29

## 2023-12-12 RX ORDER — RIVAROXABAN 20 MG/1
TABLET, FILM COATED ORAL
Qty: 30 TABLET | Refills: 2 | Status: SHIPPED | OUTPATIENT
Start: 2023-12-12

## 2023-12-13 ENCOUNTER — WALK IN (OUTPATIENT)
Dept: URGENT CARE | Age: 43
End: 2023-12-13

## 2023-12-13 VITALS
OXYGEN SATURATION: 95 % | RESPIRATION RATE: 18 BRPM | TEMPERATURE: 99.2 F | SYSTOLIC BLOOD PRESSURE: 125 MMHG | HEART RATE: 111 BPM | DIASTOLIC BLOOD PRESSURE: 87 MMHG

## 2023-12-13 DIAGNOSIS — U07.1 COVID-19 VIRUS INFECTION: Primary | ICD-10-CM

## 2023-12-13 LAB
FLUAV RNA RESP QL NAA+PROBE: NOT DETECTED
FLUBV RNA RESP QL NAA+PROBE: NOT DETECTED
RSV AG NPH QL IA.RAPID: NOT DETECTED
SARS-COV-2 RNA RESP QL NAA+PROBE: DETECTED

## 2023-12-13 PROCEDURE — 0241U POCT COVID/FLU/RSV PANEL: CPT | Performed by: PHYSICIAN ASSISTANT

## 2023-12-13 PROCEDURE — 99203 OFFICE O/P NEW LOW 30 MIN: CPT | Performed by: PHYSICIAN ASSISTANT

## 2023-12-18 ENCOUNTER — APPOINTMENT (OUTPATIENT)
Dept: ORTHOPEDICS | Age: 43
End: 2023-12-18

## 2023-12-18 VITALS — BODY MASS INDEX: 38.65 KG/M2 | WEIGHT: 270 LBS | HEIGHT: 70 IN

## 2023-12-18 DIAGNOSIS — M51.36 DDD (DEGENERATIVE DISC DISEASE), LUMBAR: Primary | ICD-10-CM

## 2023-12-18 PROCEDURE — 99202 OFFICE O/P NEW SF 15 MIN: CPT | Performed by: ORTHOPAEDIC SURGERY

## 2023-12-28 ENCOUNTER — HOSPITAL ENCOUNTER (EMERGENCY)
Age: 43
Discharge: HOME | End: 2023-12-28
Attending: EMERGENCY MEDICINE

## 2023-12-28 VITALS
OXYGEN SATURATION: 96 % | TEMPERATURE: 97.4 F | RESPIRATION RATE: 18 BRPM | DIASTOLIC BLOOD PRESSURE: 86 MMHG | SYSTOLIC BLOOD PRESSURE: 135 MMHG | HEART RATE: 91 BPM

## 2023-12-28 DIAGNOSIS — K64.9 HEMORRHOIDS, UNSPECIFIED HEMORRHOID TYPE: Primary | ICD-10-CM

## 2023-12-28 LAB
ANION GAP BLD CALC-SCNC: 16 MMOL/L (ref 7–19)
BUN BLD-MCNC: 19 MG/DL (ref 6–20)
CA-I BLD-SCNC: 1.11 MMOL/L (ref 1.15–1.29)
CHLORIDE BLD-SCNC: 107 MMOL/L (ref 97–110)
CO2 BLD-SCNC: 25 MMOL/L (ref 19–24)
CREAT SERPL-MCNC: 1 MG/DL (ref 0.67–1.17)
EGFRCR SERPLBLD CKD-EPI 2021: >90 ML/MIN/{1.73_M2}
GLUCOSE BLD-MCNC: 101 MG/DL (ref 70–99)
HCT VFR BLD CALC: 46 % (ref 39–51)
HGB BLD CALC-MCNC: 15.6 G/DL (ref 13–17)
POTASSIUM BLD-SCNC: 4 MMOL/L (ref 3.4–5.1)
SODIUM BLD-SCNC: 143 MMOL/L (ref 135–145)

## 2023-12-28 PROCEDURE — 99283 EMERGENCY DEPT VISIT LOW MDM: CPT

## 2023-12-28 PROCEDURE — 82330 ASSAY OF CALCIUM: CPT

## 2023-12-28 SDOH — SOCIAL STABILITY: SOCIAL INSECURITY: HOW OFTEN DOES ANYONE, INCLUDING FAMILY AND FRIENDS, INSULT OR TALK DOWN TO YOU?: NEVER

## 2023-12-28 SDOH — SOCIAL STABILITY: SOCIAL INSECURITY: HOW OFTEN DOES ANYONE, INCLUDING FAMILY AND FRIENDS, THREATEN YOU WITH HARM?: NEVER

## 2023-12-28 SDOH — SOCIAL STABILITY: SOCIAL INSECURITY: HOW OFTEN DOES ANYONE, INCLUDING FAMILY AND FRIENDS, PHYSICALLY HURT YOU?: NEVER

## 2023-12-28 SDOH — SOCIAL STABILITY: SOCIAL INSECURITY: HOW OFTEN DOES ANYONE, INCLUDING FAMILY AND FRIENDS, SCREAM OR CURSE AT YOU?: NEVER

## 2023-12-28 ASSESSMENT — PAIN SCALES - GENERAL
PAINLEVEL_OUTOF10: 3
PAINLEVEL_OUTOF10: 0

## 2024-01-10 ENCOUNTER — HOSPITAL ENCOUNTER (OUTPATIENT)
Dept: REHABILITATION | Age: 44
Discharge: STILL ADMITTED OR ADMINISTRATIVE | End: 2024-01-10
Attending: ORTHOPAEDIC SURGERY

## 2024-01-10 ENCOUNTER — APPOINTMENT (OUTPATIENT)
Dept: SURGERY | Age: 44
End: 2024-01-10
Attending: EMERGENCY MEDICINE

## 2024-01-10 PROCEDURE — 97110 THERAPEUTIC EXERCISES: CPT | Performed by: PHYSICAL THERAPIST

## 2024-01-10 PROCEDURE — 97161 PT EVAL LOW COMPLEX 20 MIN: CPT | Performed by: PHYSICAL THERAPIST

## 2024-01-10 PROCEDURE — 10004173 HB COUNTER-THERAPY VISIT PT: Performed by: PHYSICAL THERAPIST

## 2024-01-10 ASSESSMENT — ENCOUNTER SYMPTOMS
PAIN SCALE AT LOWEST: 2
SUBJECTIVE PAIN PROGRESSION: NO CHANGE
ALLEVIATING FACTORS: PRESCRIPTION MEDICATIONS
PAIN SEVERITY NOW: 2
PAIN SCALE AT HIGHEST: 10

## 2024-01-18 ENCOUNTER — APPOINTMENT (OUTPATIENT)
Dept: REHABILITATION | Age: 44
End: 2024-01-18

## 2024-01-18 PROCEDURE — 97140 MANUAL THERAPY 1/> REGIONS: CPT | Performed by: PHYSICAL THERAPIST

## 2024-01-18 PROCEDURE — 97110 THERAPEUTIC EXERCISES: CPT | Performed by: PHYSICAL THERAPIST

## 2024-01-18 ASSESSMENT — ENCOUNTER SYMPTOMS: PAIN SEVERITY NOW: 3

## 2024-01-19 ENCOUNTER — APPOINTMENT (OUTPATIENT)
Dept: FAMILY MEDICINE | Age: 44
End: 2024-01-19

## 2024-01-19 VITALS
TEMPERATURE: 98.7 F | BODY MASS INDEX: 39.46 KG/M2 | WEIGHT: 275 LBS | HEART RATE: 98 BPM | DIASTOLIC BLOOD PRESSURE: 86 MMHG | SYSTOLIC BLOOD PRESSURE: 127 MMHG

## 2024-01-19 DIAGNOSIS — G89.4 CHRONIC PAIN SYNDROME: ICD-10-CM

## 2024-01-19 DIAGNOSIS — M54.42 CHRONIC MIDLINE LOW BACK PAIN WITH BILATERAL SCIATICA: ICD-10-CM

## 2024-01-19 DIAGNOSIS — M54.41 CHRONIC MIDLINE LOW BACK PAIN WITH BILATERAL SCIATICA: ICD-10-CM

## 2024-01-19 DIAGNOSIS — G89.29 CHRONIC MIDLINE LOW BACK PAIN WITH BILATERAL SCIATICA: ICD-10-CM

## 2024-01-19 DIAGNOSIS — M79.7 FIBROMYALGIA: ICD-10-CM

## 2024-01-19 DIAGNOSIS — E03.8 SUBCLINICAL HYPOTHYROIDISM: Primary | ICD-10-CM

## 2024-01-19 PROCEDURE — 99214 OFFICE O/P EST MOD 30 MIN: CPT

## 2024-01-19 RX ORDER — PREGABALIN 150 MG/1
150 CAPSULE ORAL 3 TIMES DAILY
Qty: 90 CAPSULE | Refills: 1 | Status: SHIPPED | OUTPATIENT
Start: 2024-01-19

## 2024-01-22 ENCOUNTER — TELEPHONE (OUTPATIENT)
Dept: FAMILY MEDICINE | Age: 44
End: 2024-01-22

## 2024-01-22 ENCOUNTER — APPOINTMENT (OUTPATIENT)
Dept: FAMILY MEDICINE | Age: 44
End: 2024-01-22

## 2024-01-25 ENCOUNTER — APPOINTMENT (OUTPATIENT)
Dept: LAB | Age: 44
End: 2024-01-25

## 2024-01-25 ENCOUNTER — APPOINTMENT (OUTPATIENT)
Dept: REHABILITATION | Age: 44
End: 2024-01-25

## 2024-01-25 PROCEDURE — 10004173 HB COUNTER-THERAPY VISIT PT: Performed by: PHYSICAL THERAPIST

## 2024-01-25 PROCEDURE — 97110 THERAPEUTIC EXERCISES: CPT | Performed by: PHYSICAL THERAPIST

## 2024-01-25 PROCEDURE — 97140 MANUAL THERAPY 1/> REGIONS: CPT | Performed by: PHYSICAL THERAPIST

## 2024-02-01 ENCOUNTER — APPOINTMENT (OUTPATIENT)
Dept: REHABILITATION | Age: 44
End: 2024-02-01
Attending: ORTHOPAEDIC SURGERY

## 2024-02-06 ENCOUNTER — HOSPITAL ENCOUNTER (OUTPATIENT)
Dept: REHABILITATION | Age: 44
Discharge: STILL ADMITTED OR ADMINISTRATIVE | End: 2024-02-06

## 2024-02-06 DIAGNOSIS — F31.31 BIPOLAR DISORDER, CURRENT EPISODE DEPRESSED, MILD (CMD): ICD-10-CM

## 2024-02-06 DIAGNOSIS — A60.01 HERPES SIMPLEX INFECTION OF PENIS: ICD-10-CM

## 2024-02-06 DIAGNOSIS — E03.8 SUBCLINICAL HYPOTHYROIDISM: ICD-10-CM

## 2024-02-06 PROCEDURE — 97110 THERAPEUTIC EXERCISES: CPT | Performed by: PHYSICAL THERAPIST

## 2024-02-06 PROCEDURE — 10004173 HB COUNTER-THERAPY VISIT PT: Performed by: PHYSICAL THERAPIST

## 2024-02-06 RX ORDER — THYROID,PORK 15 MG
15 TABLET ORAL DAILY
Qty: 90 TABLET | Refills: 0 | Status: SHIPPED | OUTPATIENT
Start: 2024-02-06

## 2024-02-06 RX ORDER — QUETIAPINE FUMARATE 100 MG/1
200-300 TABLET, FILM COATED ORAL NIGHTLY
Qty: 270 TABLET | Refills: 1 | Status: SHIPPED | OUTPATIENT
Start: 2024-02-06

## 2024-02-06 RX ORDER — VALACYCLOVIR HYDROCHLORIDE 500 MG/1
TABLET, FILM COATED ORAL
Qty: 90 TABLET | Refills: 0 | OUTPATIENT
Start: 2024-02-06

## 2024-02-07 ENCOUNTER — LAB SERVICES (OUTPATIENT)
Dept: LAB | Age: 44
End: 2024-02-07

## 2024-02-07 DIAGNOSIS — E03.8 SUBCLINICAL HYPOTHYROIDISM: ICD-10-CM

## 2024-02-07 DIAGNOSIS — E78.5 DYSLIPIDEMIA: ICD-10-CM

## 2024-02-07 DIAGNOSIS — F31.76 BIPOLAR DISORDER, IN FULL REMISSION, MOST RECENT EPISODE DEPRESSED (CMD): ICD-10-CM

## 2024-02-07 LAB
BASOPHILS # BLD: 0.1 K/MCL (ref 0–0.3)
BASOPHILS NFR BLD: 1 %
DEPRECATED RDW RBC: 48.1 FL (ref 39–50)
EOSINOPHIL # BLD: 0.2 K/MCL (ref 0–0.5)
EOSINOPHIL NFR BLD: 4 %
ERYTHROCYTE [DISTWIDTH] IN BLOOD: 14.9 % (ref 11–15)
HCT VFR BLD CALC: 45.2 % (ref 39–51)
HGB BLD-MCNC: 14.4 G/DL (ref 13–17)
IMM GRANULOCYTES # BLD AUTO: 0 K/MCL (ref 0–0.2)
IMM GRANULOCYTES # BLD: 0 %
LYMPHOCYTES # BLD: 3.3 K/MCL (ref 1–4.8)
LYMPHOCYTES NFR BLD: 53 %
MCH RBC QN AUTO: 27.9 PG (ref 26–34)
MCHC RBC AUTO-ENTMCNC: 31.9 G/DL (ref 32–36.5)
MCV RBC AUTO: 87.6 FL (ref 78–100)
MONOCYTES # BLD: 0.6 K/MCL (ref 0.3–0.9)
MONOCYTES NFR BLD: 10 %
NEUTROPHILS # BLD: 2 K/MCL (ref 1.8–7.7)
NEUTROPHILS NFR BLD: 32 %
NRBC BLD MANUAL-RTO: 0 /100 WBC
PLATELET # BLD AUTO: 229 K/MCL (ref 140–450)
RBC # BLD: 5.16 MIL/MCL (ref 4.5–5.9)
VALPROATE SERPL-MCNC: 77 MCG/ML (ref 50–125)
WBC # BLD: 6.3 K/MCL (ref 4.2–11)

## 2024-02-07 PROCEDURE — 80164 ASSAY DIPROPYLACETIC ACD TOT: CPT | Performed by: CLINICAL MEDICAL LABORATORY

## 2024-02-07 PROCEDURE — 36415 COLL VENOUS BLD VENIPUNCTURE: CPT

## 2024-02-07 PROCEDURE — 84439 ASSAY OF FREE THYROXINE: CPT | Performed by: CLINICAL MEDICAL LABORATORY

## 2024-02-07 PROCEDURE — 80053 COMPREHEN METABOLIC PANEL: CPT | Performed by: CLINICAL MEDICAL LABORATORY

## 2024-02-07 PROCEDURE — 80061 LIPID PANEL: CPT | Performed by: CLINICAL MEDICAL LABORATORY

## 2024-02-07 PROCEDURE — 85025 COMPLETE CBC W/AUTO DIFF WBC: CPT | Performed by: CLINICAL MEDICAL LABORATORY

## 2024-02-07 PROCEDURE — 84443 ASSAY THYROID STIM HORMONE: CPT | Performed by: CLINICAL MEDICAL LABORATORY

## 2024-02-08 LAB
ALBUMIN SERPL-MCNC: 3.1 G/DL (ref 3.6–5.1)
ALBUMIN/GLOB SERPL: 0.9 {RATIO} (ref 1–2.4)
ALP SERPL-CCNC: 61 UNITS/L (ref 45–117)
ALT SERPL-CCNC: 62 UNITS/L
ANION GAP SERPL CALC-SCNC: 10 MMOL/L (ref 7–19)
AST SERPL-CCNC: 24 UNITS/L
BILIRUB SERPL-MCNC: 0.3 MG/DL (ref 0.2–1)
BUN SERPL-MCNC: 12 MG/DL (ref 6–20)
BUN/CREAT SERPL: 12 (ref 7–25)
CALCIUM SERPL-MCNC: 8.9 MG/DL (ref 8.4–10.2)
CHLORIDE SERPL-SCNC: 107 MMOL/L (ref 97–110)
CHOLEST SERPL-MCNC: 199 MG/DL
CHOLEST/HDLC SERPL: 6.4 {RATIO}
CO2 SERPL-SCNC: 28 MMOL/L (ref 21–32)
CREAT SERPL-MCNC: 1 MG/DL (ref 0.67–1.17)
EGFRCR SERPLBLD CKD-EPI 2021: >90 ML/MIN/{1.73_M2}
FASTING DURATION TIME PATIENT: 12 HOURS (ref 0–999)
GLOBULIN SER-MCNC: 3.4 G/DL (ref 2–4)
GLUCOSE SERPL-MCNC: 95 MG/DL (ref 70–99)
HDLC SERPL-MCNC: 31 MG/DL
LDLC SERPL CALC-MCNC: 114 MG/DL
NONHDLC SERPL-MCNC: 168 MG/DL
POTASSIUM SERPL-SCNC: 4.1 MMOL/L (ref 3.4–5.1)
PROT SERPL-MCNC: 6.5 G/DL (ref 6.4–8.2)
SODIUM SERPL-SCNC: 141 MMOL/L (ref 135–145)
T4 FREE SERPL-MCNC: 0.9 NG/DL (ref 0.8–1.5)
TRIGL SERPL-MCNC: 271 MG/DL
TSH SERPL-ACNC: 6.44 MCUNITS/ML (ref 0.35–5)

## 2024-02-09 ENCOUNTER — E-ADVICE (OUTPATIENT)
Dept: FAMILY MEDICINE | Age: 44
End: 2024-02-09

## 2024-02-11 ENCOUNTER — E-ADVICE (OUTPATIENT)
Dept: OTHER | Age: 44
End: 2024-02-11

## 2024-02-14 ENCOUNTER — APPOINTMENT (OUTPATIENT)
Dept: CT IMAGING | Age: 44
End: 2024-02-14
Attending: EMERGENCY MEDICINE

## 2024-02-14 ENCOUNTER — HOSPITAL ENCOUNTER (EMERGENCY)
Age: 44
Discharge: HOME | End: 2024-02-14
Attending: EMERGENCY MEDICINE

## 2024-02-14 VITALS
DIASTOLIC BLOOD PRESSURE: 79 MMHG | SYSTOLIC BLOOD PRESSURE: 141 MMHG | OXYGEN SATURATION: 97 % | RESPIRATION RATE: 19 BRPM | HEART RATE: 87 BPM

## 2024-02-14 DIAGNOSIS — R07.9 CHEST PAIN, UNSPECIFIED TYPE: Primary | ICD-10-CM

## 2024-02-14 DIAGNOSIS — R74.01 ELEVATED TRANSAMINASE LEVEL: ICD-10-CM

## 2024-02-14 LAB
ALBUMIN SERPL-MCNC: 3.1 G/DL (ref 3.6–5.1)
ALBUMIN/GLOB SERPL: 0.7 {RATIO} (ref 1–2.4)
ALP SERPL-CCNC: 68 UNITS/L (ref 45–117)
ALT SERPL-CCNC: 82 UNITS/L
ANION GAP BLD CALC-SCNC: 18 MMOL/L (ref 7–19)
ANION GAP SERPL CALC-SCNC: 10 MMOL/L (ref 7–19)
APTT PPP: 31 SEC (ref 22–32)
AST SERPL-CCNC: 44 UNITS/L
ATRIAL RATE (BPM): 86
BASOPHILS # BLD: 0.1 K/MCL (ref 0–0.3)
BASOPHILS NFR BLD: 1 %
BILIRUB SERPL-MCNC: 0.3 MG/DL (ref 0.2–1)
BUN BLD-MCNC: 19 MG/DL (ref 6–20)
BUN SERPL-MCNC: 18 MG/DL (ref 6–20)
BUN/CREAT SERPL: 19 (ref 7–25)
CA-I BLD-SCNC: 1.16 MMOL/L (ref 1.15–1.29)
CALCIUM SERPL-MCNC: 8.9 MG/DL (ref 8.4–10.2)
CHLORIDE BLD-SCNC: 106 MMOL/L (ref 97–110)
CHLORIDE SERPL-SCNC: 104 MMOL/L (ref 97–110)
CO2 BLD-SCNC: 26 MMOL/L (ref 19–24)
CO2 SERPL-SCNC: 30 MMOL/L (ref 21–32)
CREAT SERPL-MCNC: 0.9 MG/DL (ref 0.67–1.17)
CREAT SERPL-MCNC: 0.95 MG/DL (ref 0.67–1.17)
DEPRECATED RDW RBC: 46.4 FL (ref 39–50)
EGFRCR SERPLBLD CKD-EPI 2021: >90 ML/MIN/{1.73_M2}
EGFRCR SERPLBLD CKD-EPI 2021: >90 ML/MIN/{1.73_M2}
EOSINOPHIL # BLD: 0.1 K/MCL (ref 0–0.5)
EOSINOPHIL NFR BLD: 1 %
ERYTHROCYTE [DISTWIDTH] IN BLOOD: 15.1 % (ref 11–15)
FASTING DURATION TIME PATIENT: ABNORMAL H
FLUAV RNA RESP QL NAA+PROBE: NOT DETECTED
FLUBV RNA RESP QL NAA+PROBE: NOT DETECTED
GLOBULIN SER-MCNC: 4.4 G/DL (ref 2–4)
GLUCOSE BLD-MCNC: 94 MG/DL (ref 70–99)
GLUCOSE SERPL-MCNC: 100 MG/DL (ref 70–99)
HCT VFR BLD CALC: 43.8 % (ref 39–51)
HCT VFR BLD CALC: 48 % (ref 39–51)
HGB BLD CALC-MCNC: 16.3 G/DL (ref 13–17)
HGB BLD-MCNC: 14.5 G/DL (ref 13–17)
IMM GRANULOCYTES # BLD AUTO: 0 K/MCL (ref 0–0.2)
IMM GRANULOCYTES # BLD: 0 %
INR PPP: 1.1
LIPASE SERPL-CCNC: 38 UNITS/L (ref 15–77)
LYMPHOCYTES # BLD: 2.8 K/MCL (ref 1–4.8)
LYMPHOCYTES NFR BLD: 41 %
MCH RBC QN AUTO: 28.2 PG (ref 26–34)
MCHC RBC AUTO-ENTMCNC: 33.1 G/DL (ref 32–36.5)
MCV RBC AUTO: 85.2 FL (ref 78–100)
MONOCYTES # BLD: 0.7 K/MCL (ref 0.3–0.9)
MONOCYTES NFR BLD: 11 %
NEUTROPHILS # BLD: 3.2 K/MCL (ref 1.8–7.7)
NEUTROPHILS NFR BLD: 46 %
NRBC BLD MANUAL-RTO: 0 /100 WBC
NT-PROBNP SERPL-MCNC: 12 PG/ML
P AXIS (DEGREES): 41
PLATELET # BLD AUTO: 260 K/MCL (ref 140–450)
POTASSIUM BLD-SCNC: 4.4 MMOL/L (ref 3.4–5.1)
POTASSIUM SERPL-SCNC: 4.3 MMOL/L (ref 3.4–5.1)
PR-INTERVAL (MSEC): 150
PROT SERPL-MCNC: 7.5 G/DL (ref 6.4–8.2)
PROTHROMBIN TIME: 11.6 SEC (ref 9.7–11.8)
QRS-INTERVAL (MSEC): 80
QT-INTERVAL (MSEC): 362
QTC: 433
R AXIS (DEGREES): 50
RAINBOW EXTRA TUBES HOLD SPECIMEN: NORMAL
RBC # BLD: 5.14 MIL/MCL (ref 4.5–5.9)
REPORT TEXT: NORMAL
RSV AG NPH QL IA.RAPID: NOT DETECTED
SARS-COV-2 RNA RESP QL NAA+PROBE: NOT DETECTED
SERVICE CMNT-IMP: NORMAL
SERVICE CMNT-IMP: NORMAL
SODIUM BLD-SCNC: 143 MMOL/L (ref 135–145)
SODIUM SERPL-SCNC: 140 MMOL/L (ref 135–145)
T AXIS (DEGREES): 54
TROPONIN I SERPL DL<=0.01 NG/ML-MCNC: 4 NG/L
TROPONIN I SERPL DL<=0.01 NG/ML-MCNC: 4 NG/L
VENTRICULAR RATE EKG/MIN (BPM): 86
WBC # BLD: 6.8 K/MCL (ref 4.2–11)

## 2024-02-14 PROCEDURE — 93005 ELECTROCARDIOGRAM TRACING: CPT | Performed by: EMERGENCY MEDICINE

## 2024-02-14 PROCEDURE — 0241U COVID/FLU/RSV PANEL: CPT | Performed by: EMERGENCY MEDICINE

## 2024-02-14 PROCEDURE — 99285 EMERGENCY DEPT VISIT HI MDM: CPT

## 2024-02-14 PROCEDURE — 99285 EMERGENCY DEPT VISIT HI MDM: CPT | Performed by: EMERGENCY MEDICINE

## 2024-02-14 PROCEDURE — 84484 ASSAY OF TROPONIN QUANT: CPT | Performed by: EMERGENCY MEDICINE

## 2024-02-14 PROCEDURE — 85014 HEMATOCRIT: CPT

## 2024-02-14 PROCEDURE — 80053 COMPREHEN METABOLIC PANEL: CPT | Performed by: EMERGENCY MEDICINE

## 2024-02-14 PROCEDURE — 85610 PROTHROMBIN TIME: CPT | Performed by: EMERGENCY MEDICINE

## 2024-02-14 PROCEDURE — 83690 ASSAY OF LIPASE: CPT | Performed by: EMERGENCY MEDICINE

## 2024-02-14 PROCEDURE — 71275 CT ANGIOGRAPHY CHEST: CPT | Performed by: RADIOLOGY

## 2024-02-14 PROCEDURE — 36415 COLL VENOUS BLD VENIPUNCTURE: CPT

## 2024-02-14 PROCEDURE — 71275 CT ANGIOGRAPHY CHEST: CPT

## 2024-02-14 PROCEDURE — 85025 COMPLETE CBC W/AUTO DIFF WBC: CPT | Performed by: EMERGENCY MEDICINE

## 2024-02-14 PROCEDURE — 83880 ASSAY OF NATRIURETIC PEPTIDE: CPT | Performed by: EMERGENCY MEDICINE

## 2024-02-14 PROCEDURE — 85730 THROMBOPLASTIN TIME PARTIAL: CPT | Performed by: EMERGENCY MEDICINE

## 2024-02-14 PROCEDURE — 10002807 HB RX 258: Performed by: EMERGENCY MEDICINE

## 2024-02-14 PROCEDURE — 10002805 HB CONTRAST AGENT: Performed by: EMERGENCY MEDICINE

## 2024-02-14 RX ADMIN — IOHEXOL 150 ML: 350 INJECTION, SOLUTION INTRAVENOUS at 13:12

## 2024-02-14 RX ADMIN — SODIUM CHLORIDE 100 ML: 900 INJECTION INTRAVENOUS at 13:13

## 2024-02-14 SDOH — SOCIAL STABILITY: SOCIAL INSECURITY: HOW OFTEN DOES ANYONE, INCLUDING FAMILY AND FRIENDS, PHYSICALLY HURT YOU?: NEVER

## 2024-02-14 SDOH — SOCIAL STABILITY: SOCIAL INSECURITY: HOW OFTEN DOES ANYONE, INCLUDING FAMILY AND FRIENDS, SCREAM OR CURSE AT YOU?: NEVER

## 2024-02-14 SDOH — SOCIAL STABILITY: SOCIAL INSECURITY: HOW OFTEN DOES ANYONE, INCLUDING FAMILY AND FRIENDS, INSULT OR TALK DOWN TO YOU?: NEVER

## 2024-02-14 SDOH — SOCIAL STABILITY: SOCIAL INSECURITY: HOW OFTEN DOES ANYONE, INCLUDING FAMILY AND FRIENDS, THREATEN YOU WITH HARM?: NEVER

## 2024-02-14 ASSESSMENT — PAIN DESCRIPTION - PAIN TYPE: TYPE: ACUTE PAIN

## 2024-02-14 ASSESSMENT — ENCOUNTER SYMPTOMS
EYES NEGATIVE: 1
CONSTITUTIONAL NEGATIVE: 1
NEUROLOGICAL NEGATIVE: 1
RESPIRATORY NEGATIVE: 1
GASTROINTESTINAL NEGATIVE: 1
ENDOCRINE NEGATIVE: 1
HEMATOLOGIC/LYMPHATIC NEGATIVE: 1
ALLERGIC/IMMUNOLOGIC NEGATIVE: 1
PSYCHIATRIC NEGATIVE: 1

## 2024-02-14 ASSESSMENT — PAIN SCALES - GENERAL
PAINLEVEL_OUTOF10: 5
PAINLEVEL_OUTOF10: 4

## 2024-02-14 ASSESSMENT — HEART SCORE
AGE: LESS THAN OR EQUAL TO 45
HEART SCORE: 3
RISK FACTORS: 1-2 RISK FACTORS
HISTORY: MODERATELY SUSPICIOUS
TROPONIN: EQUAL OR LESS THAN NORMAL LIMIT
EKG: NON SPECIFIC REPOLARIZATION DISTURBANCE

## 2024-02-15 LAB
ATRIAL RATE (BPM): 78
P AXIS (DEGREES): 50
PR-INTERVAL (MSEC): 150
QRS-INTERVAL (MSEC): 82
QT-INTERVAL (MSEC): 386
QTC: 440
R AXIS (DEGREES): 55
REPORT TEXT: NORMAL
T AXIS (DEGREES): 63
VENTRICULAR RATE EKG/MIN (BPM): 78

## 2024-02-16 ENCOUNTER — HOSPITAL ENCOUNTER (OUTPATIENT)
Dept: REHABILITATION | Age: 44
Discharge: STILL ADMITTED OR ADMINISTRATIVE | End: 2024-02-16

## 2024-02-16 PROCEDURE — 97110 THERAPEUTIC EXERCISES: CPT | Performed by: PHYSICAL THERAPIST

## 2024-02-16 PROCEDURE — 10004173 HB COUNTER-THERAPY VISIT PT: Performed by: PHYSICAL THERAPIST

## 2024-02-26 ENCOUNTER — APPOINTMENT (OUTPATIENT)
Dept: FAMILY MEDICINE | Age: 44
End: 2024-02-26

## 2024-02-26 VITALS
OXYGEN SATURATION: 96 % | SYSTOLIC BLOOD PRESSURE: 136 MMHG | DIASTOLIC BLOOD PRESSURE: 84 MMHG | TEMPERATURE: 97.6 F | HEART RATE: 89 BPM | WEIGHT: 275.4 LBS | BODY MASS INDEX: 39.52 KG/M2

## 2024-02-26 DIAGNOSIS — G89.4 CHRONIC PAIN SYNDROME: ICD-10-CM

## 2024-02-26 DIAGNOSIS — M54.42 CHRONIC MIDLINE LOW BACK PAIN WITH BILATERAL SCIATICA: ICD-10-CM

## 2024-02-26 DIAGNOSIS — M79.7 FIBROMYALGIA: Primary | ICD-10-CM

## 2024-02-26 DIAGNOSIS — E03.8 SUBCLINICAL HYPOTHYROIDISM: ICD-10-CM

## 2024-02-26 DIAGNOSIS — Z09 FOLLOW-UP EXAM: ICD-10-CM

## 2024-02-26 DIAGNOSIS — M54.41 CHRONIC MIDLINE LOW BACK PAIN WITH BILATERAL SCIATICA: ICD-10-CM

## 2024-02-26 DIAGNOSIS — G89.29 CHRONIC MIDLINE LOW BACK PAIN WITH BILATERAL SCIATICA: ICD-10-CM

## 2024-02-26 DIAGNOSIS — R07.9 CHEST PAIN, UNSPECIFIED TYPE: ICD-10-CM

## 2024-02-26 PROCEDURE — 99214 OFFICE O/P EST MOD 30 MIN: CPT

## 2024-02-26 RX ORDER — THYROID 30 MG/1
30 TABLET ORAL DAILY
Qty: 90 TABLET | Refills: 0 | Status: SHIPPED | OUTPATIENT
Start: 2024-02-26

## 2024-02-28 ENCOUNTER — TELEPHONE (OUTPATIENT)
Dept: FAMILY MEDICINE | Age: 44
End: 2024-02-28

## 2024-03-04 ENCOUNTER — TELEPHONE (OUTPATIENT)
Dept: FAMILY MEDICINE | Age: 44
End: 2024-03-04

## 2024-03-04 DIAGNOSIS — A60.01 HERPES SIMPLEX INFECTION OF PENIS: ICD-10-CM

## 2024-03-04 DIAGNOSIS — E03.8 SUBCLINICAL HYPOTHYROIDISM: Primary | ICD-10-CM

## 2024-03-04 RX ORDER — VALACYCLOVIR HYDROCHLORIDE 500 MG/1
TABLET, FILM COATED ORAL
Qty: 90 TABLET | Refills: 0 | Status: SHIPPED | OUTPATIENT
Start: 2024-03-04

## 2024-03-06 ASSESSMENT — PATIENT HEALTH QUESTIONNAIRE - PHQ9
1. LITTLE INTEREST OR PLEASURE IN DOING THINGS: MORE THAN HALF THE DAYS
CLINICAL INTERPRETATION OF PHQ2 SCORE: NO FURTHER SCREENING NEEDED
SUM OF ALL RESPONSES TO PHQ9 QUESTIONS 1 AND 2: 2
CLINICAL INTERPRETATION OF PHQ2 SCORE: 2
2. FEELING DOWN, DEPRESSED OR HOPELESS: NOT AT ALL

## 2024-03-07 ENCOUNTER — APPOINTMENT (OUTPATIENT)
Dept: LAB | Age: 44
End: 2024-03-07

## 2024-03-11 ENCOUNTER — TELEPHONE (OUTPATIENT)
Dept: FAMILY MEDICINE | Age: 44
End: 2024-03-11

## 2024-03-11 DIAGNOSIS — E03.8 SUBCLINICAL HYPOTHYROIDISM: Primary | ICD-10-CM

## 2024-03-12 ENCOUNTER — APPOINTMENT (OUTPATIENT)
Dept: FAMILY MEDICINE | Age: 44
End: 2024-03-12

## 2024-03-12 VITALS
DIASTOLIC BLOOD PRESSURE: 74 MMHG | BODY MASS INDEX: 38.82 KG/M2 | RESPIRATION RATE: 20 BRPM | HEART RATE: 88 BPM | SYSTOLIC BLOOD PRESSURE: 128 MMHG | OXYGEN SATURATION: 95 % | TEMPERATURE: 97.6 F | WEIGHT: 271.2 LBS | HEIGHT: 70 IN

## 2024-03-12 DIAGNOSIS — Z00.00 MEDICARE ANNUAL WELLNESS VISIT, SUBSEQUENT: Primary | ICD-10-CM

## 2024-03-12 DIAGNOSIS — E66.01 SEVERE OBESITY (BMI 35.0-39.9) WITH COMORBIDITY (CMD): ICD-10-CM

## 2024-03-12 DIAGNOSIS — E03.8 SUBCLINICAL HYPOTHYROIDISM: ICD-10-CM

## 2024-03-12 DIAGNOSIS — Z79.01 CURRENT USE OF LONG TERM ANTICOAGULATION: ICD-10-CM

## 2024-03-12 DIAGNOSIS — Z86.711 HISTORY OF PULMONARY EMBOLISM: ICD-10-CM

## 2024-03-12 PROBLEM — F31.0 BIPOLAR AFFECTIVE DISORDER, CURRENT EPISODE HYPOMANIC  (CMD): Status: RESOLVED | Noted: 2020-12-15 | Resolved: 2024-03-12

## 2024-03-15 ENCOUNTER — HOSPITAL ENCOUNTER (OUTPATIENT)
Dept: REHABILITATION | Age: 44
Discharge: STILL ADMITTED OR ADMINISTRATIVE | End: 2024-03-15

## 2024-03-20 ENCOUNTER — APPOINTMENT (OUTPATIENT)
Dept: LAB | Age: 44
End: 2024-03-20

## 2024-04-02 ENCOUNTER — APPOINTMENT (OUTPATIENT)
Dept: LAB | Age: 44
End: 2024-04-02

## 2024-04-02 DIAGNOSIS — E03.8 SUBCLINICAL HYPOTHYROIDISM: ICD-10-CM

## 2024-04-02 LAB — TSH SERPL-ACNC: 2.81 MCUNITS/ML (ref 0.35–5)

## 2024-04-02 PROCEDURE — 84443 ASSAY THYROID STIM HORMONE: CPT | Performed by: CLINICAL MEDICAL LABORATORY

## 2024-04-02 PROCEDURE — 36415 COLL VENOUS BLD VENIPUNCTURE: CPT

## 2024-04-03 ENCOUNTER — TELEPHONE (OUTPATIENT)
Dept: FAMILY MEDICINE | Age: 44
End: 2024-04-03

## 2024-04-09 DIAGNOSIS — M79.7 FIBROMYALGIA: ICD-10-CM

## 2024-04-09 DIAGNOSIS — G89.29 CHRONIC MIDLINE LOW BACK PAIN WITH BILATERAL SCIATICA: ICD-10-CM

## 2024-04-09 DIAGNOSIS — M54.41 CHRONIC MIDLINE LOW BACK PAIN WITH BILATERAL SCIATICA: ICD-10-CM

## 2024-04-09 DIAGNOSIS — M54.42 CHRONIC MIDLINE LOW BACK PAIN WITH BILATERAL SCIATICA: ICD-10-CM

## 2024-04-09 DIAGNOSIS — G89.4 CHRONIC PAIN SYNDROME: ICD-10-CM

## 2024-04-09 RX ORDER — PREGABALIN 150 MG/1
CAPSULE ORAL
Qty: 90 CAPSULE | Refills: 1 | Status: SHIPPED | OUTPATIENT
Start: 2024-04-09

## 2024-04-10 ENCOUNTER — TELEPHONE (OUTPATIENT)
Dept: FAMILY MEDICINE | Age: 44
End: 2024-04-10

## 2024-04-19 RX ORDER — LEVOTHYROXINE SODIUM 0.05 MG/1
50 TABLET ORAL DAILY
Qty: 30 TABLET | Refills: 1 | Status: SHIPPED | OUTPATIENT
Start: 2024-04-19

## 2024-04-29 ENCOUNTER — E-ADVICE (OUTPATIENT)
Dept: FAMILY MEDICINE | Age: 44
End: 2024-04-29

## 2024-05-01 ENCOUNTER — TELEPHONE (OUTPATIENT)
Dept: FAMILY MEDICINE | Age: 44
End: 2024-05-01

## 2024-05-01 DIAGNOSIS — E03.8 SUBCLINICAL HYPOTHYROIDISM: Primary | ICD-10-CM

## 2024-05-01 RX ORDER — LEVOTHYROXINE SODIUM 0.05 MG/1
50 TABLET ORAL DAILY
Qty: 90 TABLET | Refills: 1 | Status: SHIPPED | OUTPATIENT
Start: 2024-05-01

## 2024-05-07 ENCOUNTER — TELEPHONE (OUTPATIENT)
Dept: FAMILY MEDICINE | Age: 44
End: 2024-05-07

## 2024-05-07 DIAGNOSIS — M54.42 CHRONIC MIDLINE LOW BACK PAIN WITH BILATERAL SCIATICA: ICD-10-CM

## 2024-05-07 DIAGNOSIS — G89.4 CHRONIC PAIN SYNDROME: ICD-10-CM

## 2024-05-07 DIAGNOSIS — G89.29 CHRONIC MIDLINE LOW BACK PAIN WITH BILATERAL SCIATICA: ICD-10-CM

## 2024-05-07 DIAGNOSIS — M79.7 FIBROMYALGIA: ICD-10-CM

## 2024-05-07 DIAGNOSIS — M54.41 CHRONIC MIDLINE LOW BACK PAIN WITH BILATERAL SCIATICA: ICD-10-CM

## 2024-05-08 RX ORDER — METHOCARBAMOL 750 MG/1
TABLET, FILM COATED ORAL
Qty: 90 TABLET | Refills: 5 | Status: SHIPPED | OUTPATIENT
Start: 2024-05-08

## 2024-05-15 ENCOUNTER — TELEPHONE (OUTPATIENT)
Dept: FAMILY MEDICINE | Age: 44
End: 2024-05-15

## 2024-05-21 ENCOUNTER — APPOINTMENT (OUTPATIENT)
Dept: OTOLARYNGOLOGY | Age: 44
End: 2024-05-21

## 2024-05-21 ENCOUNTER — APPOINTMENT (OUTPATIENT)
Dept: AUDIOLOGY | Age: 44
End: 2024-05-21

## 2024-05-21 DIAGNOSIS — H90.3 SENSORINEURAL HEARING LOSS (SNHL) OF BOTH EARS: Primary | ICD-10-CM

## 2024-05-21 DIAGNOSIS — H91.8X3 ASYMMETRICAL HEARING LOSS: Primary | ICD-10-CM

## 2024-05-21 PROCEDURE — 92557 COMPREHENSIVE HEARING TEST: CPT | Performed by: AUDIOLOGIST

## 2024-05-21 PROCEDURE — 99213 OFFICE O/P EST LOW 20 MIN: CPT | Performed by: PHYSICIAN ASSISTANT

## 2024-05-22 ENCOUNTER — APPOINTMENT (OUTPATIENT)
Dept: LAB | Age: 44
End: 2024-05-22

## 2024-05-27 DIAGNOSIS — A60.01 HERPES SIMPLEX INFECTION OF PENIS: ICD-10-CM

## 2024-05-28 RX ORDER — VALACYCLOVIR HYDROCHLORIDE 500 MG/1
TABLET, FILM COATED ORAL
Qty: 90 TABLET | Refills: 0 | Status: SHIPPED | OUTPATIENT
Start: 2024-05-28

## 2024-05-29 DIAGNOSIS — K57.30 DIVERTICULOSIS OF COLON: ICD-10-CM

## 2024-05-29 DIAGNOSIS — K21.9 GASTROESOPHAGEAL REFLUX DISEASE WITHOUT ESOPHAGITIS: ICD-10-CM

## 2024-05-29 DIAGNOSIS — K21.9 GASTROESOPHAGEAL REFLUX DISEASE, UNSPECIFIED WHETHER ESOPHAGITIS PRESENT: ICD-10-CM

## 2024-06-11 RX ORDER — PANTOPRAZOLE SODIUM 40 MG/1
40 TABLET, DELAYED RELEASE ORAL 2 TIMES DAILY
Qty: 180 TABLET | Refills: 11 | OUTPATIENT
Start: 2024-06-11

## 2024-06-19 ENCOUNTER — TELEPHONE (OUTPATIENT)
Dept: GASTROENTEROLOGY | Age: 44
End: 2024-06-19

## 2024-06-19 ENCOUNTER — TELEPHONE (OUTPATIENT)
Dept: PSYCHIATRY | Age: 44
End: 2024-06-19

## 2024-06-19 DIAGNOSIS — K21.9 GASTROESOPHAGEAL REFLUX DISEASE, UNSPECIFIED WHETHER ESOPHAGITIS PRESENT: ICD-10-CM

## 2024-06-19 DIAGNOSIS — K57.30 DIVERTICULOSIS OF COLON: ICD-10-CM

## 2024-06-19 DIAGNOSIS — F31.76 BIPOLAR DISORDER, IN FULL REMISSION, MOST RECENT EPISODE DEPRESSED  (CMD): ICD-10-CM

## 2024-06-19 DIAGNOSIS — K21.9 GASTROESOPHAGEAL REFLUX DISEASE WITHOUT ESOPHAGITIS: ICD-10-CM

## 2024-06-19 DIAGNOSIS — F31.31 BIPOLAR DISORDER, CURRENT EPISODE DEPRESSED, MILD  (CMD): ICD-10-CM

## 2024-06-19 RX ORDER — PANTOPRAZOLE SODIUM 40 MG/1
40 TABLET, DELAYED RELEASE ORAL 2 TIMES DAILY
Qty: 180 TABLET | Refills: 11 | Status: SHIPPED | OUTPATIENT
Start: 2024-06-19

## 2024-06-19 RX ORDER — ALPRAZOLAM 1 MG/1
1 TABLET ORAL NIGHTLY PRN
Qty: 30 TABLET | Refills: 0 | Status: SHIPPED | OUTPATIENT
Start: 2024-06-26

## 2024-06-19 RX ORDER — QUETIAPINE FUMARATE 100 MG/1
200-300 TABLET, FILM COATED ORAL NIGHTLY
Qty: 270 TABLET | Refills: 1 | OUTPATIENT
Start: 2024-06-19

## 2024-07-15 DIAGNOSIS — F31.76 BIPOLAR DISORDER, IN FULL REMISSION, MOST RECENT EPISODE DEPRESSED  (CMD): ICD-10-CM

## 2024-07-16 RX ORDER — ALPRAZOLAM 1 MG/1
1 TABLET ORAL NIGHTLY PRN
Qty: 30 TABLET | Refills: 0 | OUTPATIENT
Start: 2024-07-16

## 2024-07-21 DIAGNOSIS — G89.4 CHRONIC PAIN SYNDROME: ICD-10-CM

## 2024-07-21 DIAGNOSIS — M79.7 FIBROMYALGIA: ICD-10-CM

## 2024-07-21 DIAGNOSIS — G89.29 CHRONIC MIDLINE LOW BACK PAIN WITH BILATERAL SCIATICA: ICD-10-CM

## 2024-07-21 DIAGNOSIS — M54.42 CHRONIC MIDLINE LOW BACK PAIN WITH BILATERAL SCIATICA: ICD-10-CM

## 2024-07-21 DIAGNOSIS — M54.41 CHRONIC MIDLINE LOW BACK PAIN WITH BILATERAL SCIATICA: ICD-10-CM

## 2024-07-22 RX ORDER — PREGABALIN 150 MG/1
CAPSULE ORAL
Qty: 90 CAPSULE | Refills: 2 | Status: SHIPPED | OUTPATIENT
Start: 2024-07-22

## 2024-08-21 ENCOUNTER — APPOINTMENT (OUTPATIENT)
Dept: PSYCHIATRY | Age: 44
End: 2024-08-21

## 2024-08-28 ENCOUNTER — BEHAVIORAL HEALTH (OUTPATIENT)
Dept: PSYCHIATRY | Age: 44
End: 2024-08-28

## 2024-08-28 DIAGNOSIS — F31.31 BIPOLAR DISORDER, CURRENT EPISODE DEPRESSED, MILD  (CMD): ICD-10-CM

## 2024-08-28 DIAGNOSIS — F31.76 BIPOLAR DISORDER, IN FULL REMISSION, MOST RECENT EPISODE DEPRESSED  (CMD): Primary | ICD-10-CM

## 2024-08-28 PROCEDURE — 99215 OFFICE O/P EST HI 40 MIN: CPT | Performed by: NURSE PRACTITIONER

## 2024-08-28 RX ORDER — QUETIAPINE FUMARATE 100 MG/1
200-300 TABLET, FILM COATED ORAL NIGHTLY
Qty: 270 TABLET | Refills: 3 | Status: SHIPPED | OUTPATIENT
Start: 2024-08-28

## 2024-08-28 RX ORDER — DIVALPROEX SODIUM 500 MG/1
1000 TABLET, EXTENDED RELEASE ORAL AT BEDTIME
Qty: 180 TABLET | Refills: 3 | Status: SHIPPED | OUTPATIENT
Start: 2024-08-28

## 2024-08-28 RX ORDER — VILAZODONE HYDROCHLORIDE 40 MG/1
40 TABLET ORAL DAILY
Qty: 90 TABLET | Refills: 3 | Status: SHIPPED | OUTPATIENT
Start: 2024-08-28

## 2024-08-28 RX ORDER — ALPRAZOLAM 1 MG
1 TABLET ORAL NIGHTLY PRN
Qty: 30 TABLET | Refills: 5 | Status: SHIPPED | OUTPATIENT
Start: 2024-08-28

## 2024-09-04 ENCOUNTER — APPOINTMENT (OUTPATIENT)
Dept: CT IMAGING | Age: 44
End: 2024-09-04
Attending: PHYSICIAN ASSISTANT

## 2024-09-04 ENCOUNTER — HOSPITAL ENCOUNTER (EMERGENCY)
Age: 44
Discharge: HOME | End: 2024-09-05

## 2024-09-04 DIAGNOSIS — S09.90XA INJURY OF HEAD, INITIAL ENCOUNTER: Primary | ICD-10-CM

## 2024-09-04 PROCEDURE — 70450 CT HEAD/BRAIN W/O DYE: CPT

## 2024-09-04 PROCEDURE — 72125 CT NECK SPINE W/O DYE: CPT

## 2024-09-04 PROCEDURE — L0150 CERV SEMI-RIG ADJ MOLDED CHN: HCPCS | Performed by: PHYSICIAN ASSISTANT

## 2024-09-04 PROCEDURE — 99284 EMERGENCY DEPT VISIT MOD MDM: CPT

## 2024-09-04 SDOH — SOCIAL STABILITY: SOCIAL INSECURITY: HOW OFTEN DOES ANYONE, INCLUDING FAMILY AND FRIENDS, PHYSICALLY HURT YOU?: NEVER

## 2024-09-04 SDOH — SOCIAL STABILITY: SOCIAL INSECURITY: HOW OFTEN DOES ANYONE, INCLUDING FAMILY AND FRIENDS, SCREAM OR CURSE AT YOU?: NEVER

## 2024-09-04 SDOH — SOCIAL STABILITY: SOCIAL INSECURITY: HOW OFTEN DOES ANYONE, INCLUDING FAMILY AND FRIENDS, THREATEN YOU WITH HARM?: NEVER

## 2024-09-04 SDOH — SOCIAL STABILITY: SOCIAL INSECURITY: HOW OFTEN DOES ANYONE, INCLUDING FAMILY AND FRIENDS, INSULT OR TALK DOWN TO YOU?: NEVER

## 2024-09-04 ASSESSMENT — PAIN SCALES - GENERAL
PAINLEVEL_OUTOF10: 4
PAINLEVEL_OUTOF10: 4

## 2024-09-04 ASSESSMENT — PAIN DESCRIPTION - PAIN TYPE
TYPE: ACUTE PAIN
TYPE: ACUTE PAIN

## 2024-09-05 VITALS
RESPIRATION RATE: 17 BRPM | HEIGHT: 71 IN | BODY MASS INDEX: 38.02 KG/M2 | TEMPERATURE: 96.6 F | DIASTOLIC BLOOD PRESSURE: 82 MMHG | HEART RATE: 91 BPM | WEIGHT: 271.61 LBS | OXYGEN SATURATION: 94 % | SYSTOLIC BLOOD PRESSURE: 140 MMHG

## 2024-09-05 PROCEDURE — 99284 EMERGENCY DEPT VISIT MOD MDM: CPT | Performed by: PHYSICIAN ASSISTANT

## 2024-09-05 ASSESSMENT — ENCOUNTER SYMPTOMS
HEADACHES: 0
SORE THROAT: 0
SINUS PRESSURE: 0
DIARRHEA: 0
WEAKNESS: 0
EYE PAIN: 0
COUGH: 0
FEVER: 0
CHILLS: 0
SHORTNESS OF BREATH: 0
ABDOMINAL PAIN: 0
VOMITING: 0
SINUS PAIN: 0
RHINORRHEA: 0
NAUSEA: 0
DIZZINESS: 0

## 2024-09-05 ASSESSMENT — PAIN SCALES - GENERAL: PAINLEVEL_OUTOF10: 2

## 2024-09-10 DIAGNOSIS — F31.76 BIPOLAR DISORDER, IN FULL REMISSION, MOST RECENT EPISODE DEPRESSED  (CMD): ICD-10-CM

## 2024-09-10 DIAGNOSIS — Z79.01 CURRENT USE OF LONG TERM ANTICOAGULATION: ICD-10-CM

## 2024-09-10 DIAGNOSIS — Z86.711 HISTORY OF PULMONARY EMBOLISM: ICD-10-CM

## 2024-09-10 DIAGNOSIS — E03.8 SUBCLINICAL HYPOTHYROIDISM: ICD-10-CM

## 2024-09-10 RX ORDER — RIVAROXABAN 20 MG/1
20 TABLET, FILM COATED ORAL
Qty: 30 TABLET | Refills: 5 | Status: SHIPPED | OUTPATIENT
Start: 2024-09-10

## 2024-09-10 RX ORDER — LEVOTHYROXINE SODIUM 50 UG/1
50 TABLET ORAL DAILY
Qty: 90 TABLET | Refills: 1 | Status: SHIPPED | OUTPATIENT
Start: 2024-09-10

## 2024-09-11 RX ORDER — VILAZODONE HYDROCHLORIDE 40 MG/1
40 TABLET ORAL DAILY
Qty: 90 TABLET | Refills: 1 | OUTPATIENT
Start: 2024-09-11

## 2024-10-03 ENCOUNTER — APPOINTMENT (OUTPATIENT)
Dept: ULTRASOUND IMAGING | Age: 44
End: 2024-10-03

## 2024-10-03 ENCOUNTER — HOSPITAL ENCOUNTER (EMERGENCY)
Age: 44
Discharge: HOME | End: 2024-10-03
Attending: STUDENT IN AN ORGANIZED HEALTH CARE EDUCATION/TRAINING PROGRAM

## 2024-10-03 ENCOUNTER — NURSE TRIAGE (OUTPATIENT)
Dept: FAMILY MEDICINE | Age: 44
End: 2024-10-03

## 2024-10-03 VITALS
HEART RATE: 74 BPM | TEMPERATURE: 97 F | DIASTOLIC BLOOD PRESSURE: 82 MMHG | SYSTOLIC BLOOD PRESSURE: 124 MMHG | OXYGEN SATURATION: 96 % | RESPIRATION RATE: 18 BRPM

## 2024-10-03 DIAGNOSIS — M79.661 BILATERAL CALF PAIN: Primary | ICD-10-CM

## 2024-10-03 DIAGNOSIS — M79.662 BILATERAL CALF PAIN: Primary | ICD-10-CM

## 2024-10-03 PROCEDURE — 10004651 HB RX, NO CHARGE ITEM: Performed by: STUDENT IN AN ORGANIZED HEALTH CARE EDUCATION/TRAINING PROGRAM

## 2024-10-03 PROCEDURE — 99284 EMERGENCY DEPT VISIT MOD MDM: CPT

## 2024-10-03 PROCEDURE — 93970 EXTREMITY STUDY: CPT

## 2024-10-03 PROCEDURE — 99284 EMERGENCY DEPT VISIT MOD MDM: CPT | Performed by: STUDENT IN AN ORGANIZED HEALTH CARE EDUCATION/TRAINING PROGRAM

## 2024-10-03 RX ORDER — ACETAMINOPHEN 500 MG
1000 TABLET ORAL ONCE
Status: COMPLETED | OUTPATIENT
Start: 2024-10-03 | End: 2024-10-03

## 2024-10-03 RX ADMIN — ACETAMINOPHEN 1000 MG: 500 TABLET ORAL at 16:20

## 2024-10-03 ASSESSMENT — ENCOUNTER SYMPTOMS
SHORTNESS OF BREATH: 0
FEVER: 0
SORE THROAT: 0
WEAKNESS: 0
VOMITING: 0

## 2024-10-03 ASSESSMENT — PAIN DESCRIPTION - PAIN TYPE: TYPE: ACUTE PAIN

## 2024-10-03 ASSESSMENT — PAIN SCALES - GENERAL
PAINLEVEL_OUTOF10: 2
PAINLEVEL_OUTOF10: 4
PAINLEVEL_OUTOF10: 4

## 2024-10-12 ENCOUNTER — APPOINTMENT (OUTPATIENT)
Dept: CT IMAGING | Age: 44
End: 2024-10-12
Attending: EMERGENCY MEDICINE

## 2024-10-12 ENCOUNTER — HOSPITAL ENCOUNTER (EMERGENCY)
Age: 44
Discharge: HOME | End: 2024-10-12
Attending: EMERGENCY MEDICINE

## 2024-10-12 VITALS
WEIGHT: 275.35 LBS | TEMPERATURE: 97.7 F | BODY MASS INDEX: 39.42 KG/M2 | HEART RATE: 74 BPM | RESPIRATION RATE: 18 BRPM | SYSTOLIC BLOOD PRESSURE: 142 MMHG | HEIGHT: 70 IN | OXYGEN SATURATION: 96 % | DIASTOLIC BLOOD PRESSURE: 89 MMHG

## 2024-10-12 DIAGNOSIS — R51.9 ACUTE NONINTRACTABLE HEADACHE, UNSPECIFIED HEADACHE TYPE: Primary | ICD-10-CM

## 2024-10-12 LAB
ALBUMIN SERPL-MCNC: 3 G/DL (ref 3.4–5)
ALBUMIN/GLOB SERPL: 0.8 {RATIO} (ref 1–2.4)
ALP SERPL-CCNC: 69 UNITS/L (ref 45–117)
ALT SERPL-CCNC: 34 UNITS/L
ANION GAP SERPL CALC-SCNC: 13 MMOL/L (ref 7–19)
APTT PPP: 31 SEC (ref 22–32)
AST SERPL-CCNC: 18 UNITS/L
BASOPHILS # BLD: 0.1 K/MCL (ref 0–0.3)
BASOPHILS NFR BLD: 1 %
BILIRUB SERPL-MCNC: 0.3 MG/DL (ref 0.2–1)
BUN SERPL-MCNC: 12 MG/DL (ref 6–20)
BUN/CREAT SERPL: 13 (ref 7–25)
CALCIUM SERPL-MCNC: 8.7 MG/DL (ref 8.4–10.2)
CHLORIDE SERPL-SCNC: 105 MMOL/L (ref 97–110)
CO2 SERPL-SCNC: 29 MMOL/L (ref 21–32)
CREAT SERPL-MCNC: 0.91 MG/DL (ref 0.67–1.17)
DEPRECATED RDW RBC: 46.8 FL (ref 39–50)
EGFRCR SERPLBLD CKD-EPI 2021: >90 ML/MIN/{1.73_M2}
EOSINOPHIL # BLD: 0.1 K/MCL (ref 0–0.5)
EOSINOPHIL NFR BLD: 1 %
ERYTHROCYTE [DISTWIDTH] IN BLOOD: 14.8 % (ref 11–15)
FASTING DURATION TIME PATIENT: ABNORMAL H
FLUAV RNA RESP QL NAA+PROBE: NOT DETECTED
FLUBV RNA RESP QL NAA+PROBE: NOT DETECTED
GLOBULIN SER-MCNC: 3.8 G/DL (ref 2–4)
GLUCOSE SERPL-MCNC: 105 MG/DL (ref 70–99)
HCT VFR BLD CALC: 43.3 % (ref 39–51)
HGB BLD-MCNC: 14.2 G/DL (ref 13–17)
IMM GRANULOCYTES # BLD AUTO: 0 K/MCL (ref 0–0.2)
IMM GRANULOCYTES # BLD: 0 %
INR PPP: 1.1
LYMPHOCYTES # BLD: 2.2 K/MCL (ref 1–4.8)
LYMPHOCYTES NFR BLD: 32 %
MCH RBC QN AUTO: 28.3 PG (ref 26–34)
MCHC RBC AUTO-ENTMCNC: 32.8 G/DL (ref 32–36.5)
MCV RBC AUTO: 86.4 FL (ref 78–100)
MONOCYTES # BLD: 0.7 K/MCL (ref 0.3–0.9)
MONOCYTES NFR BLD: 10 %
NEUTROPHILS # BLD: 3.8 K/MCL (ref 1.8–7.7)
NEUTROPHILS NFR BLD: 56 %
NRBC BLD MANUAL-RTO: 0 /100 WBC
PLATELET # BLD AUTO: 237 K/MCL (ref 140–450)
POTASSIUM SERPL-SCNC: 4.8 MMOL/L (ref 3.4–5.1)
PROT SERPL-MCNC: 6.8 G/DL (ref 6.4–8.2)
PROTHROMBIN TIME: 11.9 SEC (ref 9.7–11.8)
RBC # BLD: 5.01 MIL/MCL (ref 4.5–5.9)
RSV AG NPH QL IA.RAPID: NOT DETECTED
SARS-COV-2 RNA RESP QL NAA+PROBE: NOT DETECTED
SERVICE CMNT-IMP: NORMAL
SERVICE CMNT-IMP: NORMAL
SODIUM SERPL-SCNC: 142 MMOL/L (ref 135–145)
WBC # BLD: 6.9 K/MCL (ref 4.2–11)

## 2024-10-12 PROCEDURE — 99284 EMERGENCY DEPT VISIT MOD MDM: CPT

## 2024-10-12 PROCEDURE — 96365 THER/PROPH/DIAG IV INF INIT: CPT

## 2024-10-12 PROCEDURE — 36415 COLL VENOUS BLD VENIPUNCTURE: CPT

## 2024-10-12 PROCEDURE — 85610 PROTHROMBIN TIME: CPT | Performed by: EMERGENCY MEDICINE

## 2024-10-12 PROCEDURE — 99285 EMERGENCY DEPT VISIT HI MDM: CPT | Performed by: EMERGENCY MEDICINE

## 2024-10-12 PROCEDURE — 10002800 HB RX 250 W HCPCS: Performed by: EMERGENCY MEDICINE

## 2024-10-12 PROCEDURE — 10002807 HB RX 258: Performed by: EMERGENCY MEDICINE

## 2024-10-12 PROCEDURE — 80053 COMPREHEN METABOLIC PANEL: CPT | Performed by: EMERGENCY MEDICINE

## 2024-10-12 PROCEDURE — 0241U COVID/FLU/RSV PANEL: CPT | Performed by: EMERGENCY MEDICINE

## 2024-10-12 PROCEDURE — 96367 TX/PROPH/DG ADDL SEQ IV INF: CPT

## 2024-10-12 PROCEDURE — 85025 COMPLETE CBC W/AUTO DIFF WBC: CPT | Performed by: EMERGENCY MEDICINE

## 2024-10-12 PROCEDURE — 85730 THROMBOPLASTIN TIME PARTIAL: CPT | Performed by: EMERGENCY MEDICINE

## 2024-10-12 PROCEDURE — 70450 CT HEAD/BRAIN W/O DYE: CPT

## 2024-10-12 RX ADMIN — METOCLOPRAMIDE 20 MG: 5 INJECTION, SOLUTION INTRAMUSCULAR; INTRAVENOUS at 14:05

## 2024-10-12 RX ADMIN — MAGNESIUM SULFATE HEPTAHYDRATE 2 G: 40 INJECTION, SOLUTION INTRAVENOUS at 13:01

## 2024-10-12 SDOH — SOCIAL STABILITY: SOCIAL INSECURITY: HOW OFTEN DOES ANYONE, INCLUDING FAMILY AND FRIENDS, PHYSICALLY HURT YOU?: NEVER

## 2024-10-12 SDOH — SOCIAL STABILITY: SOCIAL INSECURITY: HOW OFTEN DOES ANYONE, INCLUDING FAMILY AND FRIENDS, SCREAM OR CURSE AT YOU?: NEVER

## 2024-10-12 SDOH — SOCIAL STABILITY: SOCIAL INSECURITY: HOW OFTEN DOES ANYONE, INCLUDING FAMILY AND FRIENDS, INSULT OR TALK DOWN TO YOU?: NEVER

## 2024-10-12 SDOH — SOCIAL STABILITY: SOCIAL INSECURITY: HOW OFTEN DOES ANYONE, INCLUDING FAMILY AND FRIENDS, THREATEN YOU WITH HARM?: NEVER

## 2024-10-12 ASSESSMENT — PAIN SCALES - GENERAL
PAINLEVEL_OUTOF10: 10
PAINLEVEL_OUTOF10: 5
PAINLEVEL_OUTOF10: 4
PAINLEVEL_OUTOF10: 4
PAINLEVEL_OUTOF10: 10

## 2024-10-12 ASSESSMENT — PAIN DESCRIPTION - PAIN TYPE
TYPE: ACUTE PAIN

## 2024-10-13 ASSESSMENT — ENCOUNTER SYMPTOMS
RESPIRATORY NEGATIVE: 1
CONSTIPATION: 0
HEADACHES: 1
DIARRHEA: 0
FACIAL ASYMMETRY: 0
CONSTITUTIONAL NEGATIVE: 1
TREMORS: 0
NAUSEA: 1
LIGHT-HEADEDNESS: 0
WEAKNESS: 0
NUMBNESS: 0
SEIZURES: 0
VOMITING: 0
SPEECH DIFFICULTY: 0
ABDOMINAL PAIN: 0

## 2024-10-18 ENCOUNTER — APPOINTMENT (OUTPATIENT)
Dept: FAMILY MEDICINE | Age: 44
End: 2024-10-18

## 2024-10-18 VITALS
OXYGEN SATURATION: 96 % | SYSTOLIC BLOOD PRESSURE: 136 MMHG | BODY MASS INDEX: 39.07 KG/M2 | DIASTOLIC BLOOD PRESSURE: 72 MMHG | WEIGHT: 272.9 LBS | TEMPERATURE: 97.5 F | HEART RATE: 93 BPM | HEIGHT: 70 IN

## 2024-10-18 DIAGNOSIS — Z23 NEED FOR VACCINATION: ICD-10-CM

## 2024-10-18 DIAGNOSIS — I83.813 VARICOSE VEINS OF BOTH LOWER EXTREMITIES WITH PAIN: Primary | ICD-10-CM

## 2024-10-18 SDOH — ECONOMIC STABILITY: FOOD INSECURITY: WITHIN THE PAST 12 MONTHS, THE FOOD YOU BOUGHT JUST DIDN'T LAST AND YOU DIDN'T HAVE MONEY TO GET MORE.: NEVER TRUE

## 2024-10-18 SDOH — ECONOMIC STABILITY: GENERAL: WOULD YOU LIKE HELP WITH ANY OF THE FOLLOWING NEEDS?: I DON'T WANT HELP WITH ANY OF THESE

## 2024-10-18 SDOH — ECONOMIC STABILITY: TRANSPORTATION INSECURITY
IN THE PAST 12 MONTHS, HAS LACK OF RELIABLE TRANSPORTATION KEPT YOU FROM MEDICAL APPOINTMENTS, MEETINGS, WORK OR FROM GETTING THINGS NEEDED FOR DAILY LIVING?: NO

## 2024-10-18 SDOH — ECONOMIC STABILITY: HOUSING INSECURITY: WHAT IS YOUR LIVING SITUATION TODAY?: I HAVE A STEADY PLACE TO LIVE

## 2024-10-18 SDOH — ECONOMIC STABILITY: HOUSING INSECURITY: DO YOU HAVE PROBLEMS WITH ANY OF THE FOLLOWING?: NONE OF THE ABOVE

## 2024-10-18 ASSESSMENT — SOCIAL DETERMINANTS OF HEALTH (SDOH): IN THE PAST 12 MONTHS, HAS THE ELECTRIC, GAS, OIL, OR WATER COMPANY THREATENED TO SHUT OFF SERVICE IN YOUR HOME?: NO

## 2024-11-05 DIAGNOSIS — G89.4 CHRONIC PAIN SYNDROME: ICD-10-CM

## 2024-11-05 DIAGNOSIS — M79.7 FIBROMYALGIA: ICD-10-CM

## 2024-11-05 DIAGNOSIS — M54.41 CHRONIC MIDLINE LOW BACK PAIN WITH BILATERAL SCIATICA: ICD-10-CM

## 2024-11-05 DIAGNOSIS — G89.29 CHRONIC MIDLINE LOW BACK PAIN WITH BILATERAL SCIATICA: ICD-10-CM

## 2024-11-05 DIAGNOSIS — M54.42 CHRONIC MIDLINE LOW BACK PAIN WITH BILATERAL SCIATICA: ICD-10-CM

## 2024-11-05 RX ORDER — METHOCARBAMOL 750 MG/1
TABLET, FILM COATED ORAL
Qty: 90 TABLET | Refills: 5 | Status: SHIPPED | OUTPATIENT
Start: 2024-11-05

## 2024-11-15 ENCOUNTER — TELEPHONE (OUTPATIENT)
Dept: INTERVENTIONAL RADIOLOGY/VASCULAR | Age: 44
End: 2024-11-15

## 2024-11-19 ENCOUNTER — TELEPHONE (OUTPATIENT)
Dept: INTERVENTIONAL RADIOLOGY/VASCULAR | Age: 44
End: 2024-11-19

## 2024-11-20 ENCOUNTER — APPOINTMENT (OUTPATIENT)
Dept: INTERVENTIONAL RADIOLOGY/VASCULAR | Age: 44
End: 2024-11-20

## 2024-11-26 DIAGNOSIS — M54.42 CHRONIC MIDLINE LOW BACK PAIN WITH BILATERAL SCIATICA: ICD-10-CM

## 2024-11-26 DIAGNOSIS — M54.41 CHRONIC MIDLINE LOW BACK PAIN WITH BILATERAL SCIATICA: ICD-10-CM

## 2024-11-26 DIAGNOSIS — G89.29 CHRONIC MIDLINE LOW BACK PAIN WITH BILATERAL SCIATICA: ICD-10-CM

## 2024-11-26 DIAGNOSIS — G89.4 CHRONIC PAIN SYNDROME: ICD-10-CM

## 2024-11-26 DIAGNOSIS — M79.7 FIBROMYALGIA: ICD-10-CM

## 2024-11-26 RX ORDER — PREGABALIN 150 MG/1
CAPSULE ORAL
Qty: 90 CAPSULE | Refills: 2 | Status: SHIPPED | OUTPATIENT
Start: 2024-11-26

## 2024-12-10 ENCOUNTER — APPOINTMENT (OUTPATIENT)
Dept: INTERVENTIONAL RADIOLOGY/VASCULAR | Age: 44
End: 2024-12-10

## 2024-12-10 VITALS — DIASTOLIC BLOOD PRESSURE: 95 MMHG | SYSTOLIC BLOOD PRESSURE: 123 MMHG | HEART RATE: 91 BPM | TEMPERATURE: 96.7 F

## 2024-12-10 DIAGNOSIS — I83.813 VARICOSE VEINS OF BOTH LOWER EXTREMITIES WITH PAIN: Primary | ICD-10-CM

## 2024-12-10 PROCEDURE — 99203 OFFICE O/P NEW LOW 30 MIN: CPT | Performed by: NURSE PRACTITIONER

## 2024-12-15 ENCOUNTER — E-ADVICE (OUTPATIENT)
Dept: PSYCHIATRY | Age: 44
End: 2024-12-15

## 2024-12-15 DIAGNOSIS — F31.76 BIPOLAR DISORDER, IN FULL REMISSION, MOST RECENT EPISODE DEPRESSED  (CMD): ICD-10-CM

## 2024-12-15 DIAGNOSIS — F31.31 BIPOLAR DISORDER, CURRENT EPISODE DEPRESSED, MILD  (CMD): ICD-10-CM

## 2024-12-17 DIAGNOSIS — K57.30 DIVERTICULOSIS OF COLON: ICD-10-CM

## 2024-12-17 DIAGNOSIS — G89.29 CHRONIC MIDLINE LOW BACK PAIN WITH BILATERAL SCIATICA: ICD-10-CM

## 2024-12-17 DIAGNOSIS — G89.29 CHRONIC MIDLINE LOW BACK PAIN WITH LEFT-SIDED SCIATICA: ICD-10-CM

## 2024-12-17 DIAGNOSIS — E03.8 SUBCLINICAL HYPOTHYROIDISM: ICD-10-CM

## 2024-12-17 DIAGNOSIS — M54.42 CHRONIC MIDLINE LOW BACK PAIN WITH LEFT-SIDED SCIATICA: ICD-10-CM

## 2024-12-17 DIAGNOSIS — Z79.01 CURRENT USE OF LONG TERM ANTICOAGULATION: ICD-10-CM

## 2024-12-17 DIAGNOSIS — K21.9 GASTROESOPHAGEAL REFLUX DISEASE, UNSPECIFIED WHETHER ESOPHAGITIS PRESENT: ICD-10-CM

## 2024-12-17 DIAGNOSIS — M54.42 CHRONIC MIDLINE LOW BACK PAIN WITH BILATERAL SCIATICA: ICD-10-CM

## 2024-12-17 DIAGNOSIS — K21.9 GASTROESOPHAGEAL REFLUX DISEASE WITHOUT ESOPHAGITIS: ICD-10-CM

## 2024-12-17 DIAGNOSIS — M54.41 CHRONIC MIDLINE LOW BACK PAIN WITH BILATERAL SCIATICA: ICD-10-CM

## 2024-12-17 DIAGNOSIS — Z86.711 HISTORY OF PULMONARY EMBOLISM: ICD-10-CM

## 2024-12-17 DIAGNOSIS — G89.4 CHRONIC PAIN SYNDROME: ICD-10-CM

## 2024-12-17 DIAGNOSIS — A60.01 HERPES SIMPLEX INFECTION OF PENIS: ICD-10-CM

## 2024-12-17 DIAGNOSIS — M79.7 FIBROMYALGIA: ICD-10-CM

## 2024-12-17 RX ORDER — VILAZODONE HYDROCHLORIDE 40 MG/1
40 TABLET ORAL DAILY
Qty: 90 TABLET | Refills: 3 | Status: SHIPPED | OUTPATIENT
Start: 2024-12-17

## 2024-12-17 RX ORDER — THYROID 30 MG/1
30 TABLET ORAL DAILY
Qty: 90 TABLET | Refills: 0 | Status: CANCELLED | OUTPATIENT
Start: 2024-12-17

## 2024-12-17 RX ORDER — LEVOTHYROXINE SODIUM 50 UG/1
50 TABLET ORAL DAILY
Qty: 90 TABLET | Refills: 0 | Status: SHIPPED | OUTPATIENT
Start: 2024-12-17

## 2024-12-17 RX ORDER — QUETIAPINE FUMARATE 100 MG/1
200-300 TABLET, FILM COATED ORAL NIGHTLY
Qty: 270 TABLET | Refills: 3 | Status: SHIPPED | OUTPATIENT
Start: 2024-12-17

## 2024-12-17 RX ORDER — PREGABALIN 150 MG/1
CAPSULE ORAL
Qty: 90 CAPSULE | Refills: 0 | Status: SHIPPED | OUTPATIENT
Start: 2024-12-17

## 2024-12-17 RX ORDER — VALACYCLOVIR HYDROCHLORIDE 500 MG/1
500 TABLET, FILM COATED ORAL DAILY
Qty: 90 TABLET | Refills: 0 | Status: SHIPPED | OUTPATIENT
Start: 2024-12-17

## 2024-12-17 RX ORDER — PANTOPRAZOLE SODIUM 40 MG/1
40 TABLET, DELAYED RELEASE ORAL 2 TIMES DAILY
Qty: 180 TABLET | Refills: 0 | Status: SHIPPED | OUTPATIENT
Start: 2024-12-17

## 2024-12-17 RX ORDER — ALPRAZOLAM 1 MG/1
1 TABLET ORAL NIGHTLY PRN
Qty: 30 TABLET | Refills: 5 | Status: SHIPPED | OUTPATIENT
Start: 2024-12-17

## 2024-12-17 RX ORDER — METHOCARBAMOL 750 MG/1
750 TABLET, FILM COATED ORAL 3 TIMES DAILY
Qty: 90 TABLET | Refills: 0 | Status: SHIPPED | OUTPATIENT
Start: 2024-12-17

## 2024-12-17 RX ORDER — DIVALPROEX SODIUM 500 MG/1
1000 TABLET, FILM COATED, EXTENDED RELEASE ORAL AT BEDTIME
Qty: 180 TABLET | Refills: 3 | Status: SHIPPED | OUTPATIENT
Start: 2024-12-17

## 2025-03-05 ENCOUNTER — TELEPHONE (OUTPATIENT)
Dept: FAMILY MEDICINE | Age: 45
End: 2025-03-05

## 2025-03-05 DIAGNOSIS — Z13.6 SCREENING FOR ISCHEMIC HEART DISEASE: ICD-10-CM

## 2025-03-05 DIAGNOSIS — E04.1 THYROID NODULE: ICD-10-CM

## 2025-03-05 DIAGNOSIS — E78.5 DYSLIPIDEMIA: Primary | ICD-10-CM

## 2025-03-05 DIAGNOSIS — E03.8 SUBCLINICAL HYPOTHYROIDISM: ICD-10-CM

## 2025-03-12 ENCOUNTER — APPOINTMENT (OUTPATIENT)
Dept: LAB | Age: 45
End: 2025-03-12

## 2025-03-18 ENCOUNTER — APPOINTMENT (OUTPATIENT)
Dept: FAMILY MEDICINE | Age: 45
End: 2025-03-18

## 2025-03-20 DIAGNOSIS — A60.01 HERPES SIMPLEX INFECTION OF PENIS: ICD-10-CM

## 2025-03-20 RX ORDER — VALACYCLOVIR HYDROCHLORIDE 500 MG/1
500 TABLET, FILM COATED ORAL DAILY
Qty: 90 TABLET | Refills: 0 | OUTPATIENT
Start: 2025-03-20

## 2025-03-24 ENCOUNTER — TELEPHONE (OUTPATIENT)
Dept: FAMILY MEDICINE | Age: 45
End: 2025-03-24

## 2025-04-22 DIAGNOSIS — E03.8 SUBCLINICAL HYPOTHYROIDISM: ICD-10-CM

## 2025-04-22 RX ORDER — LEVOTHYROXINE SODIUM 50 UG/1
50 TABLET ORAL DAILY
Qty: 90 TABLET | Refills: 0 | Status: SHIPPED | OUTPATIENT
Start: 2025-04-22

## (undated) DEVICE — MMIS - SUTURE 2-0 SH 27IN CR MONO ABS BRN

## (undated) DEVICE — MMIS - GLOVE SURG 8 PROTEXIS LF CRM PF BEAD CUFF STRL

## (undated) DEVICE — MMIS - MARKER MULTIINK LBL SURG STRL

## (undated) DEVICE — MMIS - DRAIN INCS 1/8IN RND 12IN HMVC SIL PVC

## (undated) DEVICE — MMIS - SUTURE 4-0 FS2 27IN CR MONO ABS BRN

## (undated) DEVICE — MMIS - SYRINGE 60ML ECNTRC TIP STRL MED LF DISP BD

## (undated) DEVICE — MMIS - DRAPE EQUIPMENT 21X19X10.5IN ARM DA VINCI XI 21LB

## (undated) DEVICE — MMIS - SPONGE LAP 4X4IN 16 PLY RADOPQ STRL DERMACEA BLUE

## (undated) DEVICE — MMIS - GLOVE SURG 6.5 PROTEXIS LF CRM PF BEAD CUFF STRL

## (undated) DEVICE — MMIS - TUBE ENTRACH 10.5MM 7MM NIM CNTCT EMG MNTR

## (undated) DEVICE — MMIS - ELECTRODE ESURG BLADE PNCL 10FT BTN SWH HLSTR CORD

## (undated) DEVICE — MMIS - MAT MAG 20X16IN DEVON FOAM FLXB 122 STRL LF DISP

## (undated) DEVICE — MMIS - 2% CHLORHEXIDINE SKIN PREP ORANGE 26ML

## (undated) DEVICE — MMIS - DRAPE EQUIPMENT CLMN DA VINCI XI

## (undated) DEVICE — MMIS - STRAP PSTN 60X3IN DEVON BODY KN

## (undated) DEVICE — MMIS - BLADE SURG 15 STRL LF DISP BARD-PRKR RIB-BACK

## (undated) DEVICE — MMIS - SYRINGE 50CC IRR FNGR FLNG STRL BULB LF DISP

## (undated) DEVICE — MMIS - BLADE SURG 11 STRL LF DISP BARD-PRKR RIB-BACK

## (undated) DEVICE — MMIS - TOWEL SURG 26X15IN BLUE NABSB ADH STRIP STRL LF

## (undated) DEVICE — MMIS - SUTURE ETHILON MTPS 5-0 PS2 18IN MONO NABSB BLK

## (undated) DEVICE — MMIS - SYSTEM IMG 8X6IN WARM HUB TROCAR WIPE CLEARIFY

## (undated) DEVICE — MMIS - EVACUATOR LTWT LOW LVL SCT STRL LF DISP SURG WND

## (undated) DEVICE — MMIS - KIT SURG 40 CNT LIGHT GLV SKIN MRKR CAUT TIP CLNR

## (undated) DEVICE — MMIS - SUTURE MONOCRYL + MTPS 4-0 PS2 27IN MONO ABS

## (undated) DEVICE — MMIS - FORCEPS LAPSCP 32.77CM 8MM CADIERE DA VINCI XI

## (undated) DEVICE — MMIS - DRIVER NDL 1.4CM MEGA SUT CUT EWRST 0-35D VERY HI

## (undated) DEVICE — MMIS - SPONGE SURG 4X4IN CTN 16 PLY RADOPQ STRL LF DISP

## (undated) DEVICE — MMIS - SCISSORS LAPSCP 31.75CM 8MM DA VINCI XI HOT SHR

## (undated) DEVICE — MMIS - GLOVE SURG 7 PROTEXIS LF CRM PF BEAD CUFF STRL

## (undated) DEVICE — MMIS - SLEEVE CMPR 21- IN MED KN LGTH SEQ KENDALL SCD LF

## (undated) DEVICE — MMIS - BLADE SURGCLPR FX HEAD LED INDCTR LIGHT STRL LF

## (undated) DEVICE — MMIS - CONNECTOR UNV Y DRN CRABCLAW PVC SIL STRL LF BLUE

## (undated) DEVICE — MMIS - HOOK RETRACTOR SM CRV BLUNT 14X16MM LONE STAR 2

## (undated) DEVICE — MMIS - TUBING SCT CLR 12FT .25IN MDVC NCDTV MALE TO MALE

## (undated) DEVICE — MMIS - SUTURE VCL+ 2-0 SH 27IN BRAID COAT ABS UNDYED

## (undated) DEVICE — MMIS - ELECTRODE PT RTN 15FT CORD ADH STRIP VALLEYLAB REM

## (undated) DEVICE — MMIS - TUBING INSFL INSUF FLTR HFL LL STRL DISP

## (undated) DEVICE — MMIS - PROBE NRV STIM NM RSPN 20 NIM-NEURO 20 STD PRASS

## (undated) DEVICE — MMIS - NEEDLE HPO 19GA 1.5IN THNWL REG BVL LL HUB DEHP-FR

## (undated) DEVICE — MMIS - ADHESIVE DRMBND .7ML LIQUID APL MCBL BRR FLXB

## (undated) DEVICE — MMIS - GLOVE SURG 6 PROTEXIS LF CRM PF SMTH BEAD CUFF

## (undated) DEVICE — MMIS - SUTURE PRMHND 0 FSL 18IN SILK BRAID NABSB BLK

## (undated) DEVICE — MMIS - SYRINGE 30ML 1ML CONC TIP 1ML GRAD N-PYRG DEHP-FR

## (undated) DEVICE — MMIS - COVER FLXB STRL CNVRT LIGHT HNDL PLASTIC GRN

## (undated) DEVICE — MMIS - ELECTRODE PT RTN C30- LB 9FT CORD NONIRRITATE

## (undated) DEVICE — MMIS - NEEDLE FLTR 19GA 1.5IN 5UM REG WALL REG BVL LL HUB

## (undated) DEVICE — MMIS - NEEDLE SAFETY 22G X 1.5" HYPODERMIC"

## (undated) DEVICE — MMIS - SUTURE 3-0 SH 27IN CR MONO ABS BRN

## (undated) DEVICE — MMIS - SYRINGE 1ML SLIP TIP STRL TB LF DISP

## (undated) DEVICE — MMIS - GLOVE SURG 7.5 PROTEXIS LF CRM PF SMTH BEAD CUFF

## (undated) DEVICE — MMIS - DRAPE SURG 17X11IN ADH STRIP SM TWL MATTE FNSH

## (undated) DEVICE — MMIS - COVER ESCP TIP HOT SHR DA VINCI EWRST 8MM STD CAUT

## (undated) DEVICE — MMIS - SEAL ENDO INST 5-8MM DA VINCI XI UNV

## (undated) DEVICE — MMIS - INSTRUMENT ESURG 18.8CM LGSR 28D 3.4V 14.7MM CRV

## (undated) DEVICE — MMIS - DRAPE SURG ABD POLY LAPSCP

## (undated) DEVICE — MMIS - SUTURE PRMHND SUTUPAK 3-0 24IN BRAID TIES 13 STRN

## (undated) DEVICE — MMIS - OBTURATOR LAPSCP 8MM WECK VISTA DISP BLDLS STRL LF